# Patient Record
Sex: MALE | Race: WHITE | NOT HISPANIC OR LATINO
[De-identification: names, ages, dates, MRNs, and addresses within clinical notes are randomized per-mention and may not be internally consistent; named-entity substitution may affect disease eponyms.]

---

## 2020-02-25 ENCOUNTER — RECORD ABSTRACTING (OUTPATIENT)
Age: 70
End: 2020-02-25

## 2020-02-25 DIAGNOSIS — Z80.0 FAMILY HISTORY OF MALIGNANT NEOPLASM OF DIGESTIVE ORGANS: ICD-10-CM

## 2020-02-25 RX ORDER — OMEPRAZOLE 20 MG/1
20 TABLET, DELAYED RELEASE ORAL
Refills: 0 | Status: ACTIVE | COMMUNITY

## 2020-02-28 ENCOUNTER — APPOINTMENT (OUTPATIENT)
Dept: GASTROENTEROLOGY | Facility: CLINIC | Age: 70
End: 2020-02-28
Payer: MEDICARE

## 2020-02-28 VITALS
HEIGHT: 68 IN | WEIGHT: 160 LBS | DIASTOLIC BLOOD PRESSURE: 92 MMHG | SYSTOLIC BLOOD PRESSURE: 136 MMHG | HEART RATE: 81 BPM | BODY MASS INDEX: 24.25 KG/M2

## 2020-02-28 PROCEDURE — 99214 OFFICE O/P EST MOD 30 MIN: CPT

## 2020-02-28 RX ORDER — DEXTROAMPHETAMINE SACCHARATE, AMPHETAMINE ASPARTATE, DEXTROAMPHETAMINE SULFATE, AND AMPHETAMINE SULFATE 2.5; 2.5; 2.5; 2.5 MG/1; MG/1; MG/1; MG/1
10 TABLET ORAL
Refills: 0 | Status: DISCONTINUED | COMMUNITY
End: 2020-02-28

## 2020-02-28 NOTE — ASSESSMENT
[FreeTextEntry1] : -proctalgia - fiber, eval mucosa w/ colonoscopy - has been 9y and BMs changed as well; suspect functional.\par -esophageal dysmotility but neg manometry - will repeat EGD as still has food sticking\par -HCV - recommended fibroscan once acute issues resolved

## 2020-02-28 NOTE — HISTORY OF PRESENT ILLNESS
[FreeTextEntry1] : 69m h/o migraines, PUD, hemorrhoids, HCV (followed by Dr. Sandoval in [facility] - serial fibrotic scores - "moderate" per pt- txd w/ Gordon- SVR '16).\par \par Had rectal pressure x last wk with incomplete evacuation of stools, no bleeding/fevers/abd pain.  Sx severe, daily,  lingered but slowly improved.  No n/v/wt loss.  Stools not particularly hard.  No change in diet, meds.  \par \par Dysphagia persists, not progressing, but w/u unrevealing as below, and also saw ENT.\par \par No recent fibrosis scoring w/ Dr. Sandoval\par    \par \par        Prior testing:\par        EGD for dysphagia 7/2014 mild esophagitis, gastric diverticulum in fundus\par        RUQ sx 7-8/2013 evaluated w/ MRI+ (normal),\par        EGD 2014 RUQ pain - fundic polyp, gastric diverticulum. AFP and celiac panel neg.\par        ER for RUQ pain x2 (7/16/13, and 7/23/13) \par        On both visits, CBC, CMET, amylase/lipase essentially normal.\par        7/16/13 US neg RUQ\par        7/23/13 CT(oral only) IMPRESSION: No evidence of mass lesion or inflammatory process within the abdomen or pelvis. The etiology of this patient's abdominal pain has not been elucidated on the study. Further evaluation is recommended. The urinary bladder is distended and there is a bladder diverticulum. Fecal stasis noted.\par        EGD 11/2011 RUQ pain: gastritis, gastric polyp (xanthoma), neg celiac bx, hiatal hernia, gastric diverticulum\par        Colonoscopy 11/2011 screening, abd pain: diverticulosis f/u 8-10y\par        CT scan +iv abd/pelvis 10/2011: neg\par        Prior w/u 2005-'09 w/ Dr. Solomon WP: CLOneg, Hiatal hernia, gastritis.\par \par 2018 US normal\par 2018 dysphagia w/u\par -EGD 5/2018 tortuous esophagus, mild h. hernia, gastric polyps, fundus diverticulum\par -esophagram 6/2018 Esophagram: Marked persistent tertiary contractions in lower esophagus with corkscrew like appearance and at least one probable diverticulum. Rapid emptying of esophagus. Small hiatus hernia and spontaneous gastroesophageal reflux observed. Relatively large diverticulum of gastric fundus.\par -manometry 7/2018 normal w/ Dr. Sierra\par \par Soc:  no tobacco or significant EtOH\par FHx: no FHx GI malignancy or IBD\par \par ROS:\par Constitutional:: no weight loss, fevers\par ENT: no deafness\par Eyes: not blind\par Neck: no LN\par Chest: no dyspnea/cough\par Cardiac: no chest pain\par Vascular: no leg swelling\par GI: no abdominal pain, nausea, vomiting, diarrhea, constipation, rectal bleeding, dysphagia, melena unless otherwise noted in HPI\par : no dysuria, dark urine\par Skin: no rashes, jaundice\par Heme: no bleeding\par Endocrine: no DM unless otherwise stated in HPI\par \par Px: (VS noted below)\par General: NAD\par Eyes: anicteric\par Oropharynx:  clear\par Neck: no LN\par Chest: normal respiratory effort\par CVS: regular\par Abd: soft, NT, ND, +BS, no HSM\par Ext: no atrophy\par Neuro: grossly nonfocal\par LANI normal /no masses\par \par Labs/imaging/prior endoscopic results reviewed to the extent available and noted in HPI\par

## 2020-03-02 ENCOUNTER — RESULT REVIEW (OUTPATIENT)
Age: 70
End: 2020-03-02

## 2020-03-03 ENCOUNTER — APPOINTMENT (OUTPATIENT)
Dept: GASTROENTEROLOGY | Facility: HOSPITAL | Age: 70
End: 2020-03-03

## 2021-05-17 ENCOUNTER — APPOINTMENT (OUTPATIENT)
Dept: GASTROENTEROLOGY | Facility: CLINIC | Age: 71
End: 2021-05-17
Payer: MEDICARE

## 2021-05-17 ENCOUNTER — NON-APPOINTMENT (OUTPATIENT)
Age: 71
End: 2021-05-17

## 2021-05-17 VITALS
BODY MASS INDEX: 25.01 KG/M2 | HEIGHT: 68 IN | SYSTOLIC BLOOD PRESSURE: 130 MMHG | OXYGEN SATURATION: 98 % | DIASTOLIC BLOOD PRESSURE: 80 MMHG | HEART RATE: 84 BPM | TEMPERATURE: 98.6 F | WEIGHT: 165 LBS

## 2021-05-17 PROCEDURE — 99214 OFFICE O/P EST MOD 30 MIN: CPT

## 2021-05-18 NOTE — HISTORY OF PRESENT ILLNESS
[FreeTextEntry1] : 71 m h/o migraines, PUD, hemorrhoids, HCV (followed by Dr. Sandoval in [facility] - serial fibrotic scores - "moderate" per pt- txd w/ Gordon- SVR '16).\par \par Rectal bleeding - off/on for 1year.  May have predated his colonoscopy last year.  No anal pain.  No bulge - worse when strains.  No wt loss or change in BM otherwise.  \par \par Dysphagia resolved. \par    \par Prior testing:\par        EGD for dysphagia 7/2014 mild esophagitis, gastric diverticulum in fundus\par        RUQ sx 7-8/2013 evaluated w/ MRI+ (normal),\par        EGD 2014 RUQ pain - fundic polyp, gastric diverticulum. AFP and celiac panel neg.\par        ER for RUQ pain x2 (7/16/13, and 7/23/13) \par        On both visits, CBC, CMET, amylase/lipase essentially normal.\par        7/16/13 US neg RUQ\par        7/23/13 CT(oral only) IMPRESSION: No evidence of mass lesion or inflammatory process within the abdomen or pelvis. The etiology of this patient's abdominal pain has not been elucidated on the study. Further evaluation is recommended. The urinary bladder is distended and there is a bladder diverticulum. Fecal stasis noted.\par        EGD 11/2011 RUQ pain: gastritis, gastric polyp (xanthoma), neg celiac bx, hiatal hernia, gastric diverticulum\par        Colonoscopy 11/2011 screening, abd pain: diverticulosis f/u 8-10y\par        CT scan +iv abd/pelvis 10/2011: neg\par        Prior w/u 2005-'09 w/ Dr. Solomon WP: CLOneg, Hiatal hernia, gastritis.\par \par 2018 US normal\par 2018 dysphagia w/u\par -EGD 5/2018 tortuous esophagus, mild h. hernia, gastric polyps, fundus diverticulum\par -esophagram 6/2018 Esophagram: Marked persistent tertiary contractions in lower esophagus with corkscrew like appearance and at least one probable diverticulum. Rapid emptying of esophagus. Small hiatus hernia and spontaneous gastroesophageal reflux observed. Relatively large diverticulum of gastric fundus.\par -7/2018 manometry 7/2018 normal w/ Dr. Sierra\par 3/2020 egd for GERD/dysphagia - tortuous esophagus/?trachealized (path NORMAL), small hiatal hernia, fundus diverticulum.\par 3/2020 colonoscopy for change in BM - diverticlosis, int hemorrhoids, polyp --> 5y f/u\par \par Soc:  no tobacco or significant EtOH\par FHx: no FHx GI malignancy or IBD\par \par ROS:\par Constitutional:: no weight loss, fevers\par ENT: no deafness\par Eyes: not blind\par Neck: no LN\par Chest: no dyspnea/cough\par Cardiac: no chest pain\par Vascular: no leg swelling\par GI: no abdominal pain, nausea, vomiting, diarrhea, constipation, rectal bleeding, dysphagia, melena unless otherwise noted in HPI\par : no dysuria, dark urine\par Skin: no rashes, jaundice\par Heme: no bleeding\par Endocrine: no DM unless otherwise stated in HPI\par \par Px: (VS noted below)\par General: NAD\par Eyes: anicteric\par Oropharynx:  clear\par Neck: no LN\par Chest: normal respiratory effort\par CVS: regular\par Abd: soft, NT, ND, +BS, no HSM\par Ext: no atrophy\par Neuro: grossly nonfocal\par LANI normal /no masses\par \par Labs/imaging/prior endoscopic results reviewed to the extent available and noted in HPI\par

## 2021-05-18 NOTE — ASSESSMENT
[FreeTextEntry1] : -rectal bleeding  - fiber supplement.  Hemorrhoids known.  F/U if not resolving 2mo on fiber.\par -esophageal dysmotility but neg manometry - resolved.\par -HCV - s/p tx - will get US and then probably fibroscan.\par PMD/consultation/hospital notes and Labs/imaging/prior endoscopic results reviewed to extent noted in HPI; and, if procedure code billed on this visit for lab draw, this serves to signify that labs were drawn here in this office.\par

## 2021-05-21 ENCOUNTER — RESULT REVIEW (OUTPATIENT)
Age: 71
End: 2021-05-21

## 2021-06-24 ENCOUNTER — NON-APPOINTMENT (OUTPATIENT)
Age: 71
End: 2021-06-24

## 2021-06-24 ENCOUNTER — APPOINTMENT (OUTPATIENT)
Dept: FAMILY MEDICINE | Facility: CLINIC | Age: 71
End: 2021-06-24
Payer: MEDICARE

## 2021-06-24 VITALS
HEIGHT: 68 IN | BODY MASS INDEX: 25.76 KG/M2 | WEIGHT: 170 LBS | HEART RATE: 82 BPM | OXYGEN SATURATION: 97 % | DIASTOLIC BLOOD PRESSURE: 90 MMHG | SYSTOLIC BLOOD PRESSURE: 160 MMHG | TEMPERATURE: 97.1 F

## 2021-06-24 VITALS — SYSTOLIC BLOOD PRESSURE: 120 MMHG | DIASTOLIC BLOOD PRESSURE: 80 MMHG

## 2021-06-24 DIAGNOSIS — I49.9 CARDIAC ARRHYTHMIA, UNSPECIFIED: ICD-10-CM

## 2021-06-24 DIAGNOSIS — Z87.19 PERSONAL HISTORY OF OTHER DISEASES OF THE DIGESTIVE SYSTEM: ICD-10-CM

## 2021-06-24 DIAGNOSIS — H35.30 UNSPECIFIED MACULAR DEGENERATION: ICD-10-CM

## 2021-06-24 DIAGNOSIS — K62.89 OTHER SPECIFIED DISEASES OF ANUS AND RECTUM: ICD-10-CM

## 2021-06-24 DIAGNOSIS — R10.11 RIGHT UPPER QUADRANT PAIN: ICD-10-CM

## 2021-06-24 DIAGNOSIS — R13.10 DYSPHAGIA, UNSPECIFIED: ICD-10-CM

## 2021-06-24 DIAGNOSIS — K64.9 UNSPECIFIED HEMORRHOIDS: ICD-10-CM

## 2021-06-24 PROCEDURE — 99203 OFFICE O/P NEW LOW 30 MIN: CPT | Mod: 25

## 2021-06-24 PROCEDURE — 93000 ELECTROCARDIOGRAM COMPLETE: CPT

## 2021-06-24 RX ORDER — TADALAFIL 20 MG/1
20 TABLET, FILM COATED ORAL
Refills: 0 | Status: ACTIVE | COMMUNITY

## 2021-06-24 NOTE — HEALTH RISK ASSESSMENT
[30 or more] : 30 or more [Patient reported colonoscopy was normal] : Patient reported colonoscopy was normal [HIV test declined] : HIV test declined [Hepatitis C test declined] : Hepatitis C test declined [Very Good] : ~his/her~  mood as very good [] : No [ColonoscopyDate] : 3/2020 [ColonoscopyComments] : 5 yr

## 2021-06-24 NOTE — PHYSICAL EXAM
[Normal Rate] : normal rate  [No Murmur] : no murmur heard [Normal] : normal gait, coordination grossly intact, no focal deficits and deep tendon reflexes were 2+ and symmetric [de-identified] : Abnormal rhythm

## 2021-06-24 NOTE — HISTORY OF PRESENT ILLNESS
[de-identified] : 71-year-old male history of GERD hep C infection status post treatment with Harvoni, BPH presents today establish care exam\par Dr. Chacon - [place] - neurologist \par \par Vascepa-patient requested Vascepa at this visit.  No record of the patient's lipids.  Patient is very health-conscious and is concerned regarding a history of abnormal brain MRI and history of Alzheimer's in his father.\par \par cataracts\par macular degeneration right eye \par erosion right cornea- Ashley \par \par Hearing loss\par hearing aids \par \par fibrosis- moderate fibrosis identified on recent elastography\par Hep C treated- Harvoni- 5 yrs ago \par \par Varicocele\par Followed with urology\par Periodic drainage\par [facility] \par \par \par

## 2021-06-24 NOTE — ASSESSMENT
[FreeTextEntry1] : 71-year-old male with a history of hep C status post treatment with Harvoni, history of moderate fibrosis secondary to history of hep C, cataract, macular degeneration, varicocele, former smoker presents today to establish care.\par Abnormal rhythm was noted on exam likely secondary to respiration given normal EKG.\par

## 2021-06-30 ENCOUNTER — NON-APPOINTMENT (OUTPATIENT)
Age: 71
End: 2021-06-30

## 2021-08-02 ENCOUNTER — APPOINTMENT (OUTPATIENT)
Age: 71
End: 2021-08-02
Payer: MEDICARE

## 2021-08-02 ENCOUNTER — NON-APPOINTMENT (OUTPATIENT)
Age: 71
End: 2021-08-02

## 2021-08-02 VITALS
OXYGEN SATURATION: 98 % | SYSTOLIC BLOOD PRESSURE: 150 MMHG | HEIGHT: 68 IN | HEART RATE: 71 BPM | TEMPERATURE: 98.2 F | WEIGHT: 168 LBS | BODY MASS INDEX: 25.46 KG/M2 | DIASTOLIC BLOOD PRESSURE: 80 MMHG

## 2021-08-02 PROCEDURE — 36415 COLL VENOUS BLD VENIPUNCTURE: CPT

## 2021-08-02 PROCEDURE — G0439: CPT

## 2021-08-02 NOTE — PLAN
[FreeTextEntry1] : patient with appropriate preventative UTD \par no concerns on exam \par age appropriate counseling given\par Tetanus administered today\par CT scan to follow-up pulmonary nodules\par

## 2021-08-02 NOTE — HISTORY OF PRESENT ILLNESS
[de-identified] : 71-year-old male with history of GERD, mild sleep apnea, pulmonary nodules, hydrocele presents today for annual exam\par \par hydrocele \par Patient has periodic drainage of hydrocele with urology.\par \par Sleep apnea\par Continues using mouth apparatus\par \par History of abnormal brain MRI with microvascular changes.  Is being monitored by neurology and is recommended regular monitoring of his cholesterol

## 2021-08-02 NOTE — HEALTH RISK ASSESSMENT
[Very Good] : ~his/her~  mood as very good [10-14] : 10-14 [No] : In the past 12 months have you used drugs other than those required for medical reasons? No [No falls in past year] : Patient reported no falls in the past year [0] : 2) Feeling down, depressed, or hopeless: Not at all (0) [PHQ-2 Negative - No further assessment needed] : PHQ-2 Negative - No further assessment needed [Patient reported colonoscopy was normal] : Patient reported colonoscopy was normal [With Family] : lives with family [Retired] : retired [] :  [Fully functional (bathing, dressing, toileting, transferring, walking, feeding)] : Fully functional (bathing, dressing, toileting, transferring, walking, feeding) [Fully functional (using the telephone, shopping, preparing meals, housekeeping, doing laundry, using] : Fully functional and needs no help or supervision to perform IADLs (using the telephone, shopping, preparing meals, housekeeping, doing laundry, using transportation, managing medications and managing finances) [] : No [de-identified] : SWIMMING 4 MILES Q3days; meditation; stretching push ups sit ups [de-identified] : closely monitoring diet  [[id number]] : 0 [Change in mental status noted] : No change in mental status noted [Reports changes in hearing] : Reports no changes in hearing [Reports changes in vision] : Reports no changes in vision [ColonoscopyDate] : 2020

## 2021-08-02 NOTE — PHYSICAL EXAM
[Normal Outer Ear/Nose] : the outer ears and nose were normal in appearance [Normal TMs] : both tympanic membranes were normal [No Edema] : there was no peripheral edema [Normal] : affect was normal and insight and judgment were intact

## 2021-08-06 ENCOUNTER — RESULT REVIEW (OUTPATIENT)
Age: 71
End: 2021-08-06

## 2021-08-11 ENCOUNTER — NON-APPOINTMENT (OUTPATIENT)
Age: 71
End: 2021-08-11

## 2021-08-11 ENCOUNTER — APPOINTMENT (OUTPATIENT)
Dept: FAMILY MEDICINE | Facility: CLINIC | Age: 71
End: 2021-08-11
Payer: MEDICARE

## 2021-08-11 DIAGNOSIS — Z91.89 OTHER SPECIFIED PERSONAL RISK FACTORS, NOT ELSEWHERE CLASSIFIED: ICD-10-CM

## 2021-08-11 LAB
ALBUMIN SERPL ELPH-MCNC: 4.4 G/DL
ALP BLD-CCNC: 69 U/L
ALT SERPL-CCNC: 23 U/L
ANION GAP SERPL CALC-SCNC: 11 MMOL/L
AST SERPL-CCNC: 20 U/L
BASOPHILS # BLD AUTO: 0.05 K/UL
BASOPHILS NFR BLD AUTO: 1 %
BILIRUB SERPL-MCNC: 0.5 MG/DL
BUN SERPL-MCNC: 14 MG/DL
CALCIUM SERPL-MCNC: 9.6 MG/DL
CHLORIDE SERPL-SCNC: 104 MMOL/L
CHOLEST SERPL-MCNC: 206 MG/DL
CO2 SERPL-SCNC: 26 MMOL/L
CREAT SERPL-MCNC: 1.11 MG/DL
EOSINOPHIL # BLD AUTO: 0.31 K/UL
EOSINOPHIL NFR BLD AUTO: 6 %
GLUCOSE SERPL-MCNC: 107 MG/DL
HCT VFR BLD CALC: 44.9 %
HDLC SERPL-MCNC: 53 MG/DL
HGB BLD-MCNC: 14.6 G/DL
IMM GRANULOCYTES NFR BLD AUTO: 0.2 %
LDLC SERPL CALC-MCNC: 138 MG/DL
LYMPHOCYTES # BLD AUTO: 1.76 K/UL
LYMPHOCYTES NFR BLD AUTO: 34 %
MAN DIFF?: NORMAL
MCHC RBC-ENTMCNC: 31.3 PG
MCHC RBC-ENTMCNC: 32.5 GM/DL
MCV RBC AUTO: 96.1 FL
MONOCYTES # BLD AUTO: 0.59 K/UL
MONOCYTES NFR BLD AUTO: 11.4 %
NEUTROPHILS # BLD AUTO: 2.46 K/UL
NEUTROPHILS NFR BLD AUTO: 47.4 %
NONHDLC SERPL-MCNC: 153 MG/DL
PLATELET # BLD AUTO: 266 K/UL
POTASSIUM SERPL-SCNC: 4.6 MMOL/L
PROT SERPL-MCNC: 6.8 G/DL
RBC # BLD: 4.67 M/UL
RBC # FLD: 12.6 %
SODIUM SERPL-SCNC: 141 MMOL/L
TRIGL SERPL-MCNC: 71 MG/DL
WBC # FLD AUTO: 5.18 K/UL

## 2021-08-11 PROCEDURE — 99212 OFFICE O/P EST SF 10 MIN: CPT | Mod: 95

## 2021-08-11 NOTE — ASSESSMENT
[FreeTextEntry1] : Pulmonary nodule was noted to be 7 mm.  Stable from previous.  Patient is asymptomatic.  Continue to monitor at this time further checkups given the long history without change is unnecessary.\par Discussed with the patient the ASCVD risk score which is noted to be elevated at 17%.\par Counseled on management at this point given his history of excellent exercise and diet.  Patient would like an evaluation by cardiology to further assess his cardiac risk and determine the need for statin

## 2021-08-11 NOTE — HISTORY OF PRESENT ILLNESS
[de-identified] : 71 yr old M with a history GERD, dyslipidemia presents today for follow-up of labs\par Lipid was performed and patient notably at this time has ASCVD risk were 17.2%\par

## 2021-09-15 ENCOUNTER — APPOINTMENT (OUTPATIENT)
Dept: CARDIOLOGY | Facility: CLINIC | Age: 71
End: 2021-09-15
Payer: MEDICARE

## 2021-09-15 ENCOUNTER — NON-APPOINTMENT (OUTPATIENT)
Age: 71
End: 2021-09-15

## 2021-09-15 VITALS
SYSTOLIC BLOOD PRESSURE: 136 MMHG | DIASTOLIC BLOOD PRESSURE: 82 MMHG | OXYGEN SATURATION: 99 % | RESPIRATION RATE: 12 BRPM | HEART RATE: 69 BPM | BODY MASS INDEX: 25.54 KG/M2 | WEIGHT: 168 LBS

## 2021-09-15 DIAGNOSIS — Z78.9 OTHER SPECIFIED HEALTH STATUS: ICD-10-CM

## 2021-09-15 DIAGNOSIS — Z87.891 PERSONAL HISTORY OF NICOTINE DEPENDENCE: ICD-10-CM

## 2021-09-15 PROCEDURE — 99204 OFFICE O/P NEW MOD 45 MIN: CPT

## 2021-09-15 PROCEDURE — 93000 ELECTROCARDIOGRAM COMPLETE: CPT

## 2021-09-16 ENCOUNTER — NON-APPOINTMENT (OUTPATIENT)
Age: 71
End: 2021-09-16

## 2021-09-16 PROBLEM — Z87.891 FORMER SMOKER: Status: ACTIVE | Noted: 2021-06-24

## 2021-09-16 PROBLEM — Z78.9 CAFFEINE USE: Status: ACTIVE | Noted: 2021-09-16

## 2021-09-16 RX ORDER — ASPIRIN 81 MG
81 TABLET, DELAYED RELEASE (ENTERIC COATED) ORAL DAILY
Refills: 0 | Status: ACTIVE | COMMUNITY

## 2021-09-16 NOTE — PHYSICAL EXAM
[Well Developed] : well developed [Well Nourished] : well nourished [No Acute Distress] : no acute distress [Normal Conjunctiva] : normal conjunctiva [Normal Venous Pressure] : normal venous pressure [No Carotid Bruit] : no carotid bruit [Normal Rate] : normal [Premature Beats] : regular with premature beats [Normal S1] : normal S1 [Normal S2] : normal S2 [No Murmur] : no murmurs heard [No Pitting Edema] : no pitting edema present [2+] : left 2+ [Clear Lung Fields] : clear lung fields [Good Air Entry] : good air entry [No Respiratory Distress] : no respiratory distress  [Normal Gait] : normal gait [No Edema] : no edema [No Cyanosis] : no cyanosis [No Clubbing] : no clubbing [Moves all extremities] : moves all extremities [No Focal Deficits] : no focal deficits [Normal Speech] : normal speech [Alert and Oriented] : alert and oriented [Normal memory] : normal memory [S3] : no S3 [S4] : no S4 [Right Carotid Bruit] : no bruit heard over the right carotid [Left Carotid Bruit] : no bruit heard over the left carotid

## 2021-09-16 NOTE — HISTORY OF PRESENT ILLNESS
[FreeTextEntry1] : 70 yo male who presents today for cardiac evaluation of his hyperlipidemia and cardiac risk assessment at the request of his primary care physician. At his last PMD visit, patient was determined to have 10-year ASCVD risk of 17.2% and cardiac assessment was advised to determine need for statin. Patient denies any exertional complaints. Patient denies chest pain, dyspnea, palpitations, syncope, edema, melena, hematochezia, or hematemesis. He swims 2-3x/week, weight training 1-2x/week, and elliptical 3x/week for ~ 30 minutes without complaints. He reports having a normal stress test and echocardiogram ~ 10 years ago.\par \par PMD: David Peters MD

## 2021-09-16 NOTE — ASSESSMENT
[FreeTextEntry1] : 72 yo male with hyperlipidemia and 10-year ASCVD risk = 21% (based on his current BP and 8/2/21 labs).\par ECG today demonstrated sinus rhythm with PACs and delayed R wave progression.\par \par Given elevated 10-year ASCVD risk, it was advised that patient consider initiation of statin therapy. However, patient expressed concerns given his history of Hep C (treated) and evidence of liver fibrosis. It was explained that his LFTs were normal per 8/2/21 labs and that his labs would be monitored while on statin therapy. \par Patient wanted some time to think about it and will contact his PMD or me if he decides to initiate statin therapy.\par Given absence of exertional complaints, no cardiac testing is indicated at this time. \par Continued regular exercise and heart healthy diet were advised.

## 2021-09-20 ENCOUNTER — NON-APPOINTMENT (OUTPATIENT)
Age: 71
End: 2021-09-20

## 2021-10-25 ENCOUNTER — APPOINTMENT (OUTPATIENT)
Dept: FAMILY MEDICINE | Facility: CLINIC | Age: 71
End: 2021-10-25
Payer: MEDICARE

## 2021-10-25 VITALS
OXYGEN SATURATION: 96 % | WEIGHT: 170 LBS | HEART RATE: 70 BPM | SYSTOLIC BLOOD PRESSURE: 130 MMHG | DIASTOLIC BLOOD PRESSURE: 80 MMHG | HEIGHT: 68 IN | BODY MASS INDEX: 25.76 KG/M2 | TEMPERATURE: 97.8 F

## 2021-10-25 PROCEDURE — 99214 OFFICE O/P EST MOD 30 MIN: CPT

## 2021-10-25 NOTE — ASSESSMENT
[High Risk Surgery - Intraperitoneal, Intrathoracic or Supringuinal Vascular Procedures] : High Risk Surgery - Intraperitoneal, Intrathoracic or Supringuinal Vascular Procedures - No (0) [Ischemic Heart Disease] : Ischemic Heart Disease - No (0) [Congestive Heart Failure] : Congestive Heart Failure - No (0) [Prior Cerebrovascular Accident or TIA] : Prior Cerebrovascular Accident or TIA - No (0) [Creatinine >= 2mg/dL (1 Point)] : Creatinine >= 2mg/dL - No (0) [Insulin-dependent Diabetic (1 Point)] : Insulin-dependent Diabetic - No (0) [0] : 0 , RCRI Class: I, Risk of Post-Op Cardiac Complications: 3.9%, 95% CI for Risk Estimate: 2.8% - 5.4% [Patient Optimized for Surgery] : Patient optimized for surgery [No Further Testing Recommended] : no further testing recommended [Modify anti-platelet treatment prior to procedure] : Modify anti-platelet treatment prior to procedure [FreeTextEntry4] : 71-year-old male with history of hyperlipidemia, sleep apnea presents today for preoperative exam for cataract surgery\par RCRI 0\par Low risk for low risk procedure\par METs excellent greater than 10 [FreeTextEntry6] : Stop at least 5 days before procedure

## 2021-10-25 NOTE — HISTORY OF PRESENT ILLNESS
[No Pertinent Cardiac History] : no history of aortic stenosis, atrial fibrillation, coronary artery disease, recent myocardial infarction, or implantable device/pacemaker [Sleep Apnea] : sleep apnea [No Adverse Anesthesia Reaction] : no adverse anesthesia reaction in self or family member [(Patient denies any chest pain, claudication, dyspnea on exertion, orthopnea, palpitations or syncope)] : Patient denies any chest pain, claudication, dyspnea on exertion, orthopnea, palpitations or syncope [Excellent (>10 METs)] : Excellent (>10 METs) [Chronic Anticoagulation] : no chronic anticoagulation [Chronic Kidney Disease] : no chronic kidney disease [FreeTextEntry1] : Left cataract surgery  [FreeTextEntry2] : 11/4/2021 [FreeTextEntry3] : Dr. Chambers [FreeTextEntry4] : 71-year-old male with history of hyperlipidemia, sleep apnea presents today for preoperative exam for cataract surgery\par Moderate sleep apnea with a mouth piece \par \par Quit smoking at age 14  [FreeTextEntry7] : EKG within normal limits.  Normal sinus rhythm with a rate of 75 with noted PACs and nonspecific ST segment changes

## 2021-10-27 LAB
ALBUMIN SERPL ELPH-MCNC: 4.4 G/DL
ALP BLD-CCNC: 68 U/L
ALT SERPL-CCNC: 20 U/L
ANION GAP SERPL CALC-SCNC: 15 MMOL/L
AST SERPL-CCNC: 21 U/L
BASOPHILS # BLD AUTO: 0.04 K/UL
BASOPHILS NFR BLD AUTO: 0.8 %
BILIRUB SERPL-MCNC: 0.5 MG/DL
BUN SERPL-MCNC: 16 MG/DL
CALCIUM SERPL-MCNC: 9.5 MG/DL
CHLORIDE SERPL-SCNC: 102 MMOL/L
CK SERPL-CCNC: 76 U/L
CO2 SERPL-SCNC: 24 MMOL/L
CREAT SERPL-MCNC: 0.98 MG/DL
EOSINOPHIL # BLD AUTO: 0.32 K/UL
EOSINOPHIL NFR BLD AUTO: 6.8 %
GLUCOSE SERPL-MCNC: 114 MG/DL
HCT VFR BLD CALC: 44.9 %
HGB BLD-MCNC: 14.6 G/DL
IMM GRANULOCYTES NFR BLD AUTO: 0.2 %
LYMPHOCYTES # BLD AUTO: 1.36 K/UL
LYMPHOCYTES NFR BLD AUTO: 28.8 %
MAN DIFF?: NORMAL
MCHC RBC-ENTMCNC: 31.7 PG
MCHC RBC-ENTMCNC: 32.5 GM/DL
MCV RBC AUTO: 97.4 FL
MONOCYTES # BLD AUTO: 0.46 K/UL
MONOCYTES NFR BLD AUTO: 9.7 %
NEUTROPHILS # BLD AUTO: 2.53 K/UL
NEUTROPHILS NFR BLD AUTO: 53.7 %
PLATELET # BLD AUTO: 226 K/UL
POTASSIUM SERPL-SCNC: 4.4 MMOL/L
PROT SERPL-MCNC: 6.7 G/DL
RBC # BLD: 4.61 M/UL
RBC # FLD: 12.2 %
SODIUM SERPL-SCNC: 141 MMOL/L
WBC # FLD AUTO: 4.72 K/UL

## 2021-12-06 ENCOUNTER — APPOINTMENT (OUTPATIENT)
Dept: FAMILY MEDICINE | Facility: CLINIC | Age: 71
End: 2021-12-06

## 2021-12-06 ENCOUNTER — APPOINTMENT (OUTPATIENT)
Dept: FAMILY MEDICINE | Facility: CLINIC | Age: 71
End: 2021-12-06
Payer: MEDICARE

## 2021-12-06 VITALS
TEMPERATURE: 98.7 F | OXYGEN SATURATION: 98 % | HEIGHT: 68 IN | BODY MASS INDEX: 25.16 KG/M2 | WEIGHT: 166 LBS | SYSTOLIC BLOOD PRESSURE: 130 MMHG | HEART RATE: 71 BPM | DIASTOLIC BLOOD PRESSURE: 80 MMHG

## 2021-12-06 DIAGNOSIS — H26.9 UNSPECIFIED CATARACT: ICD-10-CM

## 2021-12-06 PROCEDURE — 36415 COLL VENOUS BLD VENIPUNCTURE: CPT

## 2021-12-06 PROCEDURE — 99215 OFFICE O/P EST HI 40 MIN: CPT | Mod: 25

## 2021-12-06 NOTE — HISTORY OF PRESENT ILLNESS
[No Pertinent Cardiac History] : no history of aortic stenosis, atrial fibrillation, coronary artery disease, recent myocardial infarction, or implantable device/pacemaker [No Pertinent Pulmonary History] : no history of asthma, COPD, sleep apnea, or smoking [No Adverse Anesthesia Reaction] : no adverse anesthesia reaction in self or family member [(Patient denies any chest pain, claudication, dyspnea on exertion, orthopnea, palpitations or syncope)] : Patient denies any chest pain, claudication, dyspnea on exertion, orthopnea, palpitations or syncope [Excellent (>10 METs)] : Excellent (>10 METs) [Chronic Anticoagulation] : no chronic anticoagulation [Chronic Kidney Disease] : no chronic kidney disease [Diabetes] : no diabetes [FreeTextEntry1] : hydrocelectomy  [FreeTextEntry2] : 12/16/2021 [FreeTextEntry3] : Dr. Clark  [FreeTextEntry4] : 71 yr old M with a history of hydrocele and dyslipidemia presenting for preoperative evaluation\par Patient is feeling well no major concerns at this time\par  [FreeTextEntry7] : EKG performed in September 2021\par Normal sinus rhythm with PACs

## 2021-12-06 NOTE — RESULTS/DATA
[] : results reviewed [ECG Reviewed] : reviewed [Normal] : The 12 - lead ECG is normal [NSR] : normal sinus rhythm

## 2021-12-06 NOTE — ASSESSMENT
[High Risk Surgery - Intraperitoneal, Intrathoracic or Supringuinal Vascular Procedures] : High Risk Surgery - Intraperitoneal, Intrathoracic or Supringuinal Vascular Procedures - No (0) [Ischemic Heart Disease] : Ischemic Heart Disease - No (0) [Congestive Heart Failure] : Congestive Heart Failure - No (0) [Prior Cerebrovascular Accident or TIA] : Prior Cerebrovascular Accident or TIA - No (0) [Creatinine >= 2mg/dL (1 Point)] : Creatinine >= 2mg/dL - No (0) [Insulin-dependent Diabetic (1 Point)] : Insulin-dependent Diabetic - No (0) [0] : 0 , RCRI Class: I, Risk of Post-Op Cardiac Complications: 3.9%, 95% CI for Risk Estimate: 2.8% - 5.4% [Patient Optimized for Surgery] : Patient optimized for surgery [FreeTextEntry4] : 71 yr old M with a history of hydrocele and dyslipidemia presenting for preoperative evaluation\par RCRI 0\par METs are excellent\par Patient is low risk for low risk procedure

## 2021-12-07 ENCOUNTER — LABORATORY RESULT (OUTPATIENT)
Age: 71
End: 2021-12-07

## 2021-12-09 LAB
ANION GAP SERPL CALC-SCNC: 17 MMOL/L
APPEARANCE: CLEAR
APTT BLD: 33.4 SEC
BACTERIA UR CULT: NORMAL
BASOPHILS # BLD AUTO: 0.04 K/UL
BASOPHILS NFR BLD AUTO: 0.7 %
BILIRUBIN URINE: NEGATIVE
BLOOD URINE: NEGATIVE
BUN SERPL-MCNC: 15 MG/DL
CALCIUM SERPL-MCNC: 10.1 MG/DL
CHLORIDE SERPL-SCNC: 102 MMOL/L
CHOLEST SERPL-MCNC: 168 MG/DL
CO2 SERPL-SCNC: 22 MMOL/L
COLOR: ABNORMAL
CREAT SERPL-MCNC: 1.14 MG/DL
EOSINOPHIL # BLD AUTO: 0.23 K/UL
EOSINOPHIL NFR BLD AUTO: 4 %
GLUCOSE QUALITATIVE U: NEGATIVE
GLUCOSE SERPL-MCNC: 101 MG/DL
HCT VFR BLD CALC: 47.2 %
HDLC SERPL-MCNC: 65 MG/DL
HGB BLD-MCNC: 15.7 G/DL
IMM GRANULOCYTES NFR BLD AUTO: 0.2 %
INR PPP: 1.01 RATIO
KETONES URINE: NEGATIVE
LDLC SERPL CALC-MCNC: 89 MG/DL
LEUKOCYTE ESTERASE URINE: NEGATIVE
LYMPHOCYTES # BLD AUTO: 1.37 K/UL
LYMPHOCYTES NFR BLD AUTO: 24 %
MAN DIFF?: NORMAL
MCHC RBC-ENTMCNC: 32.2 PG
MCHC RBC-ENTMCNC: 33.3 GM/DL
MCV RBC AUTO: 96.9 FL
MONOCYTES # BLD AUTO: 0.56 K/UL
MONOCYTES NFR BLD AUTO: 9.8 %
NEUTROPHILS # BLD AUTO: 3.49 K/UL
NEUTROPHILS NFR BLD AUTO: 61.3 %
NITRITE URINE: NEGATIVE
NONHDLC SERPL-MCNC: 103 MG/DL
PH URINE: 6
PLATELET # BLD AUTO: 256 K/UL
POTASSIUM SERPL-SCNC: 4.3 MMOL/L
PROTEIN URINE: NORMAL
PT BLD: 12 SEC
RBC # BLD: 4.87 M/UL
RBC # FLD: 12.3 %
SODIUM SERPL-SCNC: 141 MMOL/L
SPECIFIC GRAVITY URINE: 1.03
TRIGL SERPL-MCNC: 66 MG/DL
UROBILINOGEN URINE: NORMAL
WBC # FLD AUTO: 5.7 K/UL

## 2021-12-17 ENCOUNTER — RX RENEWAL (OUTPATIENT)
Age: 71
End: 2021-12-17

## 2021-12-22 ENCOUNTER — RX RENEWAL (OUTPATIENT)
Age: 71
End: 2021-12-22

## 2022-02-28 ENCOUNTER — APPOINTMENT (OUTPATIENT)
Dept: FAMILY MEDICINE | Facility: CLINIC | Age: 72
End: 2022-02-28
Payer: MEDICARE

## 2022-02-28 VITALS
HEIGHT: 68 IN | WEIGHT: 170 LBS | BODY MASS INDEX: 25.76 KG/M2 | DIASTOLIC BLOOD PRESSURE: 70 MMHG | OXYGEN SATURATION: 97 % | SYSTOLIC BLOOD PRESSURE: 120 MMHG | TEMPERATURE: 98.5 F | HEART RATE: 78 BPM

## 2022-02-28 PROCEDURE — 36415 COLL VENOUS BLD VENIPUNCTURE: CPT

## 2022-02-28 PROCEDURE — 99213 OFFICE O/P EST LOW 20 MIN: CPT | Mod: 25

## 2022-02-28 NOTE — HISTORY OF PRESENT ILLNESS
[de-identified] : 71-year-old male with a history of hyperlipidemia presents today for follow-up of hyperlipidemia\par The patient was switched to every other day dosing rosuvastatin due to intolerance\par Symptoms were libido feeling weak\par Patient is tolerating well at this time\par

## 2022-03-01 LAB
CHOLEST SERPL-MCNC: 160 MG/DL
HDLC SERPL-MCNC: 53 MG/DL
LDLC SERPL CALC-MCNC: 89 MG/DL
NONHDLC SERPL-MCNC: 107 MG/DL
TRIGL SERPL-MCNC: 90 MG/DL

## 2022-05-27 ENCOUNTER — APPOINTMENT (OUTPATIENT)
Dept: FAMILY MEDICINE | Facility: CLINIC | Age: 72
End: 2022-05-27

## 2022-08-03 ENCOUNTER — APPOINTMENT (OUTPATIENT)
Dept: FAMILY MEDICINE | Facility: CLINIC | Age: 72
End: 2022-08-03

## 2022-08-03 VITALS
OXYGEN SATURATION: 96 % | SYSTOLIC BLOOD PRESSURE: 140 MMHG | DIASTOLIC BLOOD PRESSURE: 80 MMHG | BODY MASS INDEX: 24.55 KG/M2 | HEIGHT: 68 IN | HEART RATE: 80 BPM | WEIGHT: 162 LBS | TEMPERATURE: 98.5 F

## 2022-08-03 VITALS — DIASTOLIC BLOOD PRESSURE: 80 MMHG | SYSTOLIC BLOOD PRESSURE: 120 MMHG

## 2022-08-03 DIAGNOSIS — R91.8 OTHER NONSPECIFIC ABNORMAL FINDING OF LUNG FIELD: ICD-10-CM

## 2022-08-03 DIAGNOSIS — R90.89 OTHER ABNORMAL FINDINGS ON DIAGNOSTIC IMAGING OF CENTRAL NERVOUS SYSTEM: ICD-10-CM

## 2022-08-03 DIAGNOSIS — Z23 ENCOUNTER FOR IMMUNIZATION: ICD-10-CM

## 2022-08-03 DIAGNOSIS — Z87.438 PERSONAL HISTORY OF OTHER DISEASES OF MALE GENITAL ORGANS: ICD-10-CM

## 2022-08-03 DIAGNOSIS — Z01.818 ENCOUNTER FOR OTHER PREPROCEDURAL EXAMINATION: ICD-10-CM

## 2022-08-03 DIAGNOSIS — G47.30 SLEEP APNEA, UNSPECIFIED: ICD-10-CM

## 2022-08-03 DIAGNOSIS — Z12.5 ENCOUNTER FOR SCREENING FOR MALIGNANT NEOPLASM OF PROSTATE: ICD-10-CM

## 2022-08-03 DIAGNOSIS — Z86.79 PERSONAL HISTORY OF OTHER DISEASES OF THE CIRCULATORY SYSTEM: ICD-10-CM

## 2022-08-03 PROCEDURE — 36415 COLL VENOUS BLD VENIPUNCTURE: CPT

## 2022-08-03 PROCEDURE — G0439: CPT

## 2022-08-03 RX ORDER — FLUOXETINE HYDROCHLORIDE 20 MG/1
20 CAPSULE ORAL
Qty: 30 | Refills: 0 | Status: COMPLETED | COMMUNITY
Start: 2022-07-06

## 2022-08-03 RX ORDER — KETOROLAC TROMETHAMINE 5 MG/ML
0.5 SOLUTION OPHTHALMIC
Qty: 5 | Refills: 0 | Status: DISCONTINUED | COMMUNITY
Start: 2021-10-26 | End: 2022-08-03

## 2022-08-03 RX ORDER — GATIFLOXACIN 5 MG/ML
0.5 SOLUTION/ DROPS OPHTHALMIC
Qty: 2 | Refills: 0 | Status: COMPLETED | COMMUNITY
Start: 2021-10-26 | End: 2022-08-03

## 2022-08-03 RX ORDER — METHYLPHENIDATE HYDROCHLORIDE 18 MG/1
18 TABLET, EXTENDED RELEASE ORAL
Qty: 30 | Refills: 0 | Status: DISCONTINUED | COMMUNITY
Start: 2021-06-04 | End: 2022-08-03

## 2022-08-03 RX ORDER — PREDNISOLONE ACETATE 10 MG/ML
1 SUSPENSION/ DROPS OPHTHALMIC
Qty: 5 | Refills: 0 | Status: DISCONTINUED | COMMUNITY
Start: 2021-10-26 | End: 2022-08-03

## 2022-08-03 RX ORDER — TOBRAMYCIN AND DEXAMETHASONE 3; 1 MG/ML; MG/ML
0.3-0.1 SUSPENSION/ DROPS OPHTHALMIC
Qty: 5 | Refills: 0 | Status: COMPLETED | COMMUNITY
Start: 2022-06-28

## 2022-08-03 RX ORDER — TRIAMCINOLONE ACETONIDE 1 MG/G
0.1 CREAM TOPICAL
Qty: 80 | Refills: 0 | Status: COMPLETED | COMMUNITY
Start: 2022-07-14

## 2022-08-04 LAB
ALBUMIN SERPL ELPH-MCNC: 4.7 G/DL
ALP BLD-CCNC: 65 U/L
ALT SERPL-CCNC: 15 U/L
ANION GAP SERPL CALC-SCNC: 13 MMOL/L
APPEARANCE: CLEAR
AST SERPL-CCNC: 20 U/L
BACTERIA: NEGATIVE
BASOPHILS # BLD AUTO: 0.03 K/UL
BASOPHILS NFR BLD AUTO: 0.5 %
BILIRUB SERPL-MCNC: 0.7 MG/DL
BILIRUBIN URINE: NEGATIVE
BLOOD URINE: NEGATIVE
BUN SERPL-MCNC: 16 MG/DL
CALCIUM SERPL-MCNC: 10.1 MG/DL
CHLORIDE SERPL-SCNC: 102 MMOL/L
CHOLEST SERPL-MCNC: 155 MG/DL
CO2 SERPL-SCNC: 24 MMOL/L
COLOR: ABNORMAL
CREAT SERPL-MCNC: 0.88 MG/DL
EGFR: 91 ML/MIN/1.73M2
EOSINOPHIL # BLD AUTO: 0.31 K/UL
EOSINOPHIL NFR BLD AUTO: 5.7 %
ESTIMATED AVERAGE GLUCOSE: 114 MG/DL
GLUCOSE QUALITATIVE U: NEGATIVE
GLUCOSE SERPL-MCNC: 165 MG/DL
HBA1C MFR BLD HPLC: 5.6 %
HCT VFR BLD CALC: 43.1 %
HDLC SERPL-MCNC: 52 MG/DL
HGB BLD-MCNC: 14.7 G/DL
HYALINE CASTS: 0 /LPF
IMM GRANULOCYTES NFR BLD AUTO: 0 %
KETONES URINE: NEGATIVE
LDLC SERPL CALC-MCNC: 88 MG/DL
LEUKOCYTE ESTERASE URINE: NEGATIVE
LYMPHOCYTES # BLD AUTO: 1.06 K/UL
LYMPHOCYTES NFR BLD AUTO: 19.4 %
MAN DIFF?: NORMAL
MCHC RBC-ENTMCNC: 31.8 PG
MCHC RBC-ENTMCNC: 34.1 GM/DL
MCV RBC AUTO: 93.3 FL
MICROSCOPIC-UA: NORMAL
MONOCYTES # BLD AUTO: 0.44 K/UL
MONOCYTES NFR BLD AUTO: 8.1 %
NEUTROPHILS # BLD AUTO: 3.62 K/UL
NEUTROPHILS NFR BLD AUTO: 66.3 %
NITRITE URINE: NEGATIVE
NONHDLC SERPL-MCNC: 103 MG/DL
PH URINE: 6.5
PLATELET # BLD AUTO: 246 K/UL
POTASSIUM SERPL-SCNC: 4.3 MMOL/L
PROT SERPL-MCNC: 7 G/DL
PROTEIN URINE: NEGATIVE
PSA FREE FLD-MCNC: 20 %
PSA FREE SERPL-MCNC: 0.18 NG/ML
PSA SERPL-MCNC: 0.92 NG/ML
RBC # BLD: 4.62 M/UL
RBC # FLD: 12.2 %
RED BLOOD CELLS URINE: 1 /HPF
SODIUM SERPL-SCNC: 139 MMOL/L
SPECIFIC GRAVITY URINE: 1.03
SQUAMOUS EPITHELIAL CELLS: 0 /HPF
TRIGL SERPL-MCNC: 78 MG/DL
UROBILINOGEN URINE: NORMAL
WBC # FLD AUTO: 5.46 K/UL
WHITE BLOOD CELLS URINE: 0 /HPF

## 2022-08-04 NOTE — ASSESSMENT
[FreeTextEntry1] : patient with appropriate preventative UTD \par no concerns on exam \par age appropriate counseling given\par shingrix and pneumonia received from pharmacy

## 2022-08-04 NOTE — HISTORY OF PRESENT ILLNESS
[de-identified] : 72 yr old M with a history of cerebral microvascular disease, GERD, hyperlipidemia, presents today for annual exam\par \par Rosuvastatin and renal damage article noted by patient\par checking and liver - \par pt wishes to check testosterone level- concern for fatigue \par Has a history of BPH follows with urology\par [place] in October- \par Dr. Rios \par shingles, tdap and prevnar UTD

## 2022-08-04 NOTE — HEALTH RISK ASSESSMENT
[Former] : Former [No] : In the past 12 months have you used drugs other than those required for medical reasons? No [No falls in past year] : Patient reported no falls in the past year [0] : 2) Feeling down, depressed, or hopeless: Not at all (0) [PHQ-2 Negative - No further assessment needed] : PHQ-2 Negative - No further assessment needed [Patient reported colonoscopy was normal] : Patient reported colonoscopy was normal [With Family] : lives with family [Employed] : employed [Very Good] : ~his/her~  mood as very good [None] : None [Fully functional (bathing, dressing, toileting, transferring, walking, feeding)] : Fully functional (bathing, dressing, toileting, transferring, walking, feeding) [Fully functional (using the telephone, shopping, preparing meals, housekeeping, doing laundry, using] : Fully functional and needs no help or supervision to perform IADLs (using the telephone, shopping, preparing meals, housekeeping, doing laundry, using transportation, managing medications and managing finances) [Reviewed no changes] : Reviewed, no changes [de-identified] : regular exercise  [de-identified] : monitoring diet closely  [[id number]] : 0 [Change in mental status noted] : No change in mental status noted [ColonoscopyDate] : 2020

## 2022-08-08 LAB
TESTOST FREE SERPL-MCNC: 6.2 PG/ML
TESTOST SERPL-MCNC: 460 NG/DL

## 2022-08-18 ENCOUNTER — APPOINTMENT (OUTPATIENT)
Dept: GASTROENTEROLOGY | Facility: CLINIC | Age: 72
End: 2022-08-18

## 2022-08-18 VITALS
SYSTOLIC BLOOD PRESSURE: 131 MMHG | BODY MASS INDEX: 24.55 KG/M2 | OXYGEN SATURATION: 95 % | HEIGHT: 68 IN | TEMPERATURE: 97 F | DIASTOLIC BLOOD PRESSURE: 70 MMHG | WEIGHT: 162 LBS | HEART RATE: 79 BPM

## 2022-08-18 DIAGNOSIS — B19.20 UNSPECIFIED VIRAL HEPATITIS C W/OUT HEPATIC COMA: ICD-10-CM

## 2022-08-18 DIAGNOSIS — K21.00 GASTRO-ESOPHAGEAL REFLUX DISEASE WITH ESOPHAGITIS, WITHOUT BLEEDING: ICD-10-CM

## 2022-08-18 DIAGNOSIS — R19.4 CHANGE IN BOWEL HABIT: ICD-10-CM

## 2022-08-18 DIAGNOSIS — R13.10 DYSPHAGIA, UNSPECIFIED: ICD-10-CM

## 2022-08-18 DIAGNOSIS — Z12.11 ENCOUNTER FOR SCREENING FOR MALIGNANT NEOPLASM OF COLON: ICD-10-CM

## 2022-08-18 PROCEDURE — 99214 OFFICE O/P EST MOD 30 MIN: CPT

## 2022-08-20 PROBLEM — R19.4 CHANGE IN BOWEL HABITS: Status: ACTIVE | Noted: 2022-08-20

## 2022-08-20 PROBLEM — B19.20 HEPATITIS C VIRUS INFECTION WITHOUT HEPATIC COMA, UNSPECIFIED CHRONICITY: Status: ACTIVE | Noted: 2020-02-25

## 2022-08-20 PROBLEM — K21.00 GASTROESOPHAGEAL REFLUX DISEASE WITH ESOPHAGITIS: Status: ACTIVE | Noted: 2020-02-25

## 2022-08-20 PROBLEM — R13.10 DYSPHAGIA: Status: ACTIVE | Noted: 2022-08-20

## 2022-08-20 PROBLEM — Z12.11 COLON CANCER SCREENING: Status: ACTIVE | Noted: 2022-08-20

## 2022-08-20 NOTE — HISTORY OF PRESENT ILLNESS
[FreeTextEntry1] : 72 m h/o migraines, PUD, hemorrhoids, HCV (Tx w/ Dr. German - "moderate" fibrosis per pt- txd w/ Gordon- SVR '16),here for 2 wks ago rectal pressure, associated w/ clear discharge, mild constipation.  No bleeding, abd pain, wt loss, fever.  No tenesmus. \par \par No dysphagia \par    \par Prior testing:\par        EGD for dysphagia 7/2014 mild esophagitis, gastric diverticulum in fundus\par        RUQ sx 7-8/2013 evaluated w/ MRI+ (normal),\par        EGD 2014 RUQ pain - fundic polyp, gastric diverticulum. AFP and celiac panel neg.\par        ER for RUQ pain x2 (7/16/13, and 7/23/13) \par        On both visits, CBC, CMET, amylase/lipase essentially normal.\par        7/16/13 US neg RUQ\par        7/23/13 CT(oral only) IMPRESSION: No evidence of mass lesion or inflammatory process within the abdomen or pelvis. The etiology of this patient's abdominal pain has not been elucidated on the study. Further evaluation is recommended. The urinary bladder is distended and there is a bladder diverticulum. Fecal stasis noted.\par        EGD 11/2011 RUQ pain: gastritis, gastric polyp (xanthoma), neg celiac bx, hiatal hernia, gastric diverticulum\par        Colonoscopy 11/2011 screening, abd pain: diverticulosis f/u 8-10y\par        CT scan +iv abd/pelvis 10/2011: neg\par        Prior w/u 2005-'09 w/ Dr. Solomon WP: CLOneg, Hiatal hernia, gastritis.\par \par 2018 US normal\par 2018 dysphagia w/u\par -EGD 5/2018 tortuous esophagus, mild h. hernia, gastric polyps, fundus diverticulum\par -esophagram 6/2018 Esophagram: Marked persistent tertiary contractions in lower esophagus with corkscrew like appearance and at least one probable diverticulum. Rapid emptying of esophagus. Small hiatus hernia and spontaneous gastroesophageal reflux observed. Relatively large diverticulum of gastric fundus.\par -7/2018 manometry 7/2018 normal w/ Dr. Sierra\par 3/2020 egd for GERD/dysphagia - tortuous esophagus/?trachealized (path NORMAL), small hiatal hernia, fundus diverticulum.  Declined repeat manometry\par 3/2020 colonoscopy for change in BM - diverticlosis, int hemorrhoids, polyp --> 5y f/u\par 5-6/2021 US normal, elastography f2-3, rec US cont'd f/u - 6mo\par \par Soc:  no tobacco or significant EtOH\par FHx: no FHx GI malignancy or IBD\par \par ROS:\par Constitutional:: no weight loss, fevers\par ENT: no deafness\par Eyes: not blind\par Neck: no LN\par Chest: no dyspnea/cough\par Cardiac: no chest pain\par Vascular: no leg swelling\par GI: no abdominal pain, nausea, vomiting, diarrhea, constipation, rectal bleeding, dysphagia, melena unless otherwise noted in HPI\par : no dysuria, dark urine\par Skin: no rashes, jaundice\par Heme: no bleeding\par Endocrine: no DM unless otherwise stated in HPI\par \par Px: (VS noted below)\par General: NAD\par Eyes: anicteric\par Oropharynx:  clear\par Neck: no LN\par Chest: normal respiratory effort\par CVS: regular\par Abd: soft, NT, ND, +BS, no HSM\par Ext: no atrophy\par Neuro: grossly nonfocal\par LANI normal /no masses\par \par Labs/imaging/prior endoscopic results reviewed to the extent available and noted in HPI\par

## 2022-08-20 NOTE — ASSESSMENT
[FreeTextEntry1] : -change in BM - similar to '20 sx that prompted unremarkable colonoscopy at that time - normal exam today - advised fiber supplement and f/u if not improving in next month\par -GERD/dysphagia - esophageal dysmotility but neg manometry - resolved sx - refused repeat manometry\par -HCV - s/p tx - will get US \par -Colon cancer screening - colonoscopy due in 2025\par \par PMD/consultation/hospital notes and Labs/imaging/prior endoscopic results reviewed to extent noted in HPI; and, if procedure code billed on this visit for lab draw, this serves to signify that labs were drawn here in this office.\par

## 2022-08-30 ENCOUNTER — RESULT REVIEW (OUTPATIENT)
Age: 72
End: 2022-08-30

## 2022-11-28 ENCOUNTER — NON-APPOINTMENT (OUTPATIENT)
Age: 72
End: 2022-11-28

## 2023-01-18 ENCOUNTER — APPOINTMENT (OUTPATIENT)
Dept: PULMONOLOGY | Facility: CLINIC | Age: 73
End: 2023-01-18
Payer: MEDICARE

## 2023-01-18 ENCOUNTER — RESULT REVIEW (OUTPATIENT)
Age: 73
End: 2023-01-18

## 2023-01-18 VITALS
HEIGHT: 68 IN | WEIGHT: 160 LBS | DIASTOLIC BLOOD PRESSURE: 80 MMHG | BODY MASS INDEX: 24.25 KG/M2 | SYSTOLIC BLOOD PRESSURE: 140 MMHG | TEMPERATURE: 96.8 F

## 2023-01-18 DIAGNOSIS — J20.9 ACUTE BRONCHITIS, UNSPECIFIED: ICD-10-CM

## 2023-01-18 PROCEDURE — 99204 OFFICE O/P NEW MOD 45 MIN: CPT

## 2023-01-18 NOTE — HISTORY OF PRESENT ILLNESS
[FreeTextEntry1] : Cough chest congestion x2 weeks [de-identified] : 72-year-old male former heavy smoker but quit at age 29. He complains over the past 2 weeks of chest congestion upper chest discomfort persistent nasal drip and green sputum production for 2 weeks. He's been on Keflex 500 t.i.d. for the past 10 days for dental infection. He occasionally has slight wheezing and feels slightly short of breath that time. There is no fever but maybe has had some chills. There is no prior history of lung problems. He tested negative for covid 4 days ago. There are no sick contacts. He gives history of allergy to dust and molds although there's been no help with Flonase and Benadryl. Past medical history significant for anxiety disorder.

## 2023-01-18 NOTE — ASSESSMENT
[FreeTextEntry1] : We probably are dealing with a viral bronchitis with persistent airway inflammation. Patient will be referred for a chest x-ray. We will call patient with results and discuss further.

## 2023-02-10 NOTE — HISTORY OF PRESENT ILLNESS
[FreeTextEntry1] : 72-year-old male former heavy smoker but quit at age 29, cerebral microvascular disease, GERD, hyperlipidemia, with a history of a hiatal hernia. He now presents to thoracic surgery for discussion of possible hiatal hernia treatment/surgery.

## 2023-02-10 NOTE — DATA REVIEWED
[FreeTextEntry1] : CT chest 8/6/2021\par Exam Date: 	  08/06/21					\par Exam: 	CT CHEST					\par Order#:	[id number]					\par INDICATIONS:71-year-old male with history of pulmonary nodules.\par TECHNIQUE: Axial images of the chest were obtained. No intravenous contrast was administered.\par COMPARISON: Outside CT chest study from [facility] 10/31/2006\par FINDINGS:\par Heart: Unremarkable. No evidence of pericardial effusion. Scattered focal coronary artery calcifications, in particular within the left main.\par Mediastinum: Thyroid unremarkable. Scattered, tiny lymph nodes. The esophagus shows evidence of a small hiatal hernia.\par Aorta: The aorta is within normal limits.\par Pulmonary arteries: The diameter of the pulmonary arteries are unremarkable. Is a tiny subpleural nodule in the right lower lobe, measuring 7.2 mm in diameter (7: 379). In comparison to prior, no change at all.\par Lung parenchyma: No evidence of significant pulmonary nodules.\par Pleura: No evidence of pleural effusion.\par Airways: Widely patent airways.\par Upper abdomen: Note is made of a gastric diverticulum. Small hiatal hernia as mentioned above. Gallbladder is unremarkable. No evidence of calcified gallstones. The adrenal glands are within normal limits.\par Soft tissues: Unremarkable soft tissues.\par Bones: Scattered, mild degenerative disc disease in the midthoracic spine.\par IMPRESSION:\par  Tiny, stable subpleural right lower lobe nodule measuring 7.2 mm diameter, stable in comparison to the prior exam. \par \par 3/2020 endoscopy\par IMP small hiatal hernia, incidental fundus diverticulum, tortuous esophagus, colon diverticulosis, int hemorrhoids, polyp \par \par

## 2023-02-13 ENCOUNTER — APPOINTMENT (OUTPATIENT)
Dept: THORACIC SURGERY | Facility: CLINIC | Age: 73
End: 2023-02-13

## 2023-02-14 ENCOUNTER — NON-APPOINTMENT (OUTPATIENT)
Age: 73
End: 2023-02-14

## 2023-02-14 ENCOUNTER — APPOINTMENT (OUTPATIENT)
Dept: SURGERY | Facility: CLINIC | Age: 73
End: 2023-02-14
Payer: MEDICARE

## 2023-02-14 VITALS
BODY MASS INDEX: 25.55 KG/M2 | HEART RATE: 70 BPM | DIASTOLIC BLOOD PRESSURE: 90 MMHG | WEIGHT: 168.6 LBS | HEIGHT: 68 IN | SYSTOLIC BLOOD PRESSURE: 141 MMHG | OXYGEN SATURATION: 97 %

## 2023-02-14 DIAGNOSIS — K46.9 UNSPECIFIED ABDOMINAL HERNIA W/OUT OBSTRUCTION OR GANGRENE: ICD-10-CM

## 2023-02-14 PROCEDURE — 99203 OFFICE O/P NEW LOW 30 MIN: CPT

## 2023-02-14 RX ORDER — CLONAZEPAM 0.5 MG/1
0.5 TABLET ORAL
Qty: 14 | Refills: 0 | Status: COMPLETED | COMMUNITY
Start: 2022-05-26 | End: 2023-02-14

## 2023-02-14 RX ORDER — PREDNISONE 10 MG/1
10 TABLET ORAL
Qty: 20 | Refills: 2 | Status: COMPLETED | COMMUNITY
Start: 2023-01-18 | End: 2023-02-14

## 2023-02-14 NOTE — ASSESSMENT
[FreeTextEntry1] : Referring physician Dr. Peters\par \par Date: 2/14/2023\par \par Reason for referral left groin pain\par \par This is a 72-year-old gentleman who states approximately a month ago he had a severe bout of coughing and since then he has had discomfort and pain in the left groin episodically.  Pain is described to be in the left groin region along the crease.  It is in 1 specific location.  Patient denies having a lump in this area.  He has had no bouts of nausea or vomiting when the pain occurs.  The pain is short lasted.\par \par He is status post open left inguinal hernia repair as well as status post bilateral laparoscopic inguinal hernia repair\par \par Physical examination patient examined erect and supine position.  He is placed through a multitude of Valsalva maneuvers.  He has no obvious inguinal hernias identified in either groin.  Scrotum and testicles are within normal limits.\par \par Patient does have point tenderness in the left groin below the anterior iliac spine but above where 1 would expect to find an inguinal hernia.  He has no pain with flexion internal and external rotation at the hip and he also had no pain with extension at the hip this is all on the left side.  He has no pubalgia.\par \par Impression/plan episodic left groin pain: This is a 72-year-old gentleman who was fine up until approximately a month ago when he had some severe coughing episodes and now has episodic pain in the left groin.  Due to the discomfort he experiences he think he may have a recurrent hernia but based on examination there is no evidence of such.  Patient myself had a very long conversation regarding the pros and cons of obtaining a CAT scan in this setting and the potential implications of the CTs demonstrating a fat-containing inguinal hernia.  Due to the paucity of findings on examination today and my explanation, the patient is opted not to proceed with any further imaging studies at this point.  He will live with his discomfort and take over-the-counter pain meds as needed.  If the pain worsens or he develops a lump in the area he will return to the office to be reevaluated or undergo a CAT scan.\par \par Thank you for allowing me to participate in the care of your patient should you have any questions please feel free to give me a call directly\par \par Sincerely yours,

## 2023-06-19 ENCOUNTER — APPOINTMENT (OUTPATIENT)
Age: 73
End: 2023-06-19
Payer: MEDICARE

## 2023-06-19 ENCOUNTER — LABORATORY RESULT (OUTPATIENT)
Age: 73
End: 2023-06-19

## 2023-06-19 VITALS
HEIGHT: 68 IN | HEART RATE: 81 BPM | OXYGEN SATURATION: 97 % | SYSTOLIC BLOOD PRESSURE: 140 MMHG | BODY MASS INDEX: 24.25 KG/M2 | DIASTOLIC BLOOD PRESSURE: 80 MMHG | WEIGHT: 160 LBS

## 2023-06-19 PROCEDURE — 36415 COLL VENOUS BLD VENIPUNCTURE: CPT

## 2023-06-19 PROCEDURE — 99214 OFFICE O/P EST MOD 30 MIN: CPT | Mod: 25

## 2023-06-19 RX ORDER — ROSUVASTATIN CALCIUM 5 MG/1
5 TABLET, FILM COATED ORAL
Qty: 90 | Refills: 3 | Status: DISCONTINUED | COMMUNITY
Start: 2021-09-20 | End: 2023-06-19

## 2023-06-19 NOTE — HISTORY OF PRESENT ILLNESS
[FreeTextEntry1] : follow up [de-identified] : 73 year of age M presents for follow up\par \par knee, wrist and hip pain- multiple joints are more tender and painful than usual. Located bilaterally. Denies any swelling of joints. Does not recall tick bite. Taking rosuvastatin. Denies any fevers but has some fatigue. No rashes except purpura. Takes martial arts. \par \par bruising on arms/elbows- present for 8 years but has worsened. Previously diagnosed with senile purpura by dermatologist.

## 2023-06-19 NOTE — ASSESSMENT
[FreeTextEntry1] : Multiple joint pains-\par Patient is concerned for underlying rheumatologic illness-\par -suspect based off the patient's history of sports and martial arts this is likely osteoarthritis and normal wear and tear from years of vigorous exercise to these joints\par -He would like an eval with rheumatology\par -We will rule out underlying rheumatologic illnesses as best as possible\par \par \par Low testosterone history\par -Patient is aware low testosterone may be related to age \par -We will assess his testosterone level\par \par Hyperlipidemia\par -Check lipids

## 2023-06-19 NOTE — PHYSICAL EXAM
[Normal] : no joint swelling and grossly normal strength and tone [de-identified] : Full range of motion of multiple extremities noted, tenderness to palpation of the shoulder joints, bilateral medial knees, base of the thumb bilaterally,lateral hips bilaterally

## 2023-06-21 LAB
ALBUMIN SERPL ELPH-MCNC: 4.6 G/DL
ALP BLD-CCNC: 66 U/L
ALT SERPL-CCNC: 16 U/L
ANA SER IF-ACNC: NEGATIVE
ANION GAP SERPL CALC-SCNC: 11 MMOL/L
AST SERPL-CCNC: 21 U/L
BILIRUB SERPL-MCNC: 0.8 MG/DL
BUN SERPL-MCNC: 18 MG/DL
CALCIUM SERPL-MCNC: 9.5 MG/DL
CHLORIDE SERPL-SCNC: 103 MMOL/L
CK SERPL-CCNC: 71 U/L
CO2 SERPL-SCNC: 28 MMOL/L
CREAT SERPL-MCNC: 0.86 MG/DL
CRP SERPL-MCNC: <3 MG/L
EGFR: 91 ML/MIN/1.73M2
ERYTHROCYTE [SEDIMENTATION RATE] IN BLOOD BY WESTERGREN METHOD: 9 MM/HR
GLUCOSE SERPL-MCNC: 110 MG/DL
POTASSIUM SERPL-SCNC: 4.8 MMOL/L
PROT SERPL-MCNC: 6.8 G/DL
RHEUMATOID FACT SER QL: <10 IU/ML
SODIUM SERPL-SCNC: 141 MMOL/L
TSH SERPL-ACNC: 1.33 UIU/ML

## 2023-06-22 LAB
TESTOST FREE SERPL-MCNC: 4.3 PG/ML
TESTOST SERPL-MCNC: 387 NG/DL

## 2023-07-10 DIAGNOSIS — R79.89 OTHER SPECIFIED ABNORMAL FINDINGS OF BLOOD CHEMISTRY: ICD-10-CM

## 2023-07-13 LAB
TESTOST FREE SERPL-MCNC: 12.9 PG/ML
TESTOST SERPL-MCNC: 439 NG/DL

## 2023-07-20 ENCOUNTER — APPOINTMENT (OUTPATIENT)
Dept: RHEUMATOLOGY | Facility: CLINIC | Age: 73
End: 2023-07-20
Payer: MEDICARE

## 2023-07-20 ENCOUNTER — RESULT REVIEW (OUTPATIENT)
Age: 73
End: 2023-07-20

## 2023-07-20 VITALS
OXYGEN SATURATION: 98 % | HEIGHT: 68 IN | BODY MASS INDEX: 24.55 KG/M2 | DIASTOLIC BLOOD PRESSURE: 74 MMHG | HEART RATE: 83 BPM | WEIGHT: 162 LBS | SYSTOLIC BLOOD PRESSURE: 126 MMHG

## 2023-07-20 DIAGNOSIS — M79.642 PAIN IN RIGHT HAND: ICD-10-CM

## 2023-07-20 DIAGNOSIS — M79.10 MYALGIA, UNSPECIFIED SITE: ICD-10-CM

## 2023-07-20 DIAGNOSIS — M79.641 PAIN IN RIGHT HAND: ICD-10-CM

## 2023-07-20 DIAGNOSIS — M25.50 PAIN IN UNSPECIFIED JOINT: ICD-10-CM

## 2023-07-20 PROCEDURE — 99204 OFFICE O/P NEW MOD 45 MIN: CPT

## 2023-07-20 RX ORDER — VENLAFAXINE HYDROCHLORIDE 75 MG/1
75 CAPSULE, EXTENDED RELEASE ORAL
Qty: 21 | Refills: 0 | Status: DISCONTINUED | COMMUNITY
Start: 2022-07-19 | End: 2023-07-20

## 2023-07-20 NOTE — HISTORY OF PRESENT ILLNESS
[FreeTextEntry1] : 74yo M with PMHx of BPH, HLD, knee osteoarthritis in the office for evaluation\par \par History\par diffuse arthralgias\par pain over knee, wrist, elbows, shoulders\par for around 6 weeks\par variable intensity but symptoms mostly daily\par no synovitis or dactylitis described\par no fever, chills or intentional weight loss\par no skin rashes\par some degree of neck discomfort\par tylenol, advil, voltaren gel help\par on statins, switch to pravastatin by pcp\par extensive blood work reviewed

## 2023-07-20 NOTE — PHYSICAL EXAM
[General Appearance - Alert] : alert [General Appearance - In No Acute Distress] : in no acute distress [Sclera] : the sclera and conjunctiva were normal [Neck Appearance] : the appearance of the neck was normal [Auscultation Breath Sounds / Voice Sounds] : lungs were clear to auscultation bilaterally [Heart Rate And Rhythm] : heart rate was normal and rhythm regular [Heart Sounds] : normal S1 and S2 [FreeTextEntry1] : multiple tender point over wrist, knees and forearm area. joint fORM, no restriction [] : no rash [No Focal Deficits] : no focal deficits [Oriented To Time, Place, And Person] : oriented to person, place, and time

## 2023-07-27 ENCOUNTER — RESULT REVIEW (OUTPATIENT)
Age: 73
End: 2023-07-27

## 2023-07-27 ENCOUNTER — NON-APPOINTMENT (OUTPATIENT)
Age: 73
End: 2023-07-27

## 2023-07-27 ENCOUNTER — TRANSCRIPTION ENCOUNTER (OUTPATIENT)
Age: 73
End: 2023-07-27

## 2023-07-28 ENCOUNTER — RESULT REVIEW (OUTPATIENT)
Age: 73
End: 2023-07-28

## 2023-07-30 ENCOUNTER — TRANSCRIPTION ENCOUNTER (OUTPATIENT)
Age: 73
End: 2023-07-30

## 2023-08-01 ENCOUNTER — RESULT REVIEW (OUTPATIENT)
Age: 73
End: 2023-08-01

## 2023-08-02 ENCOUNTER — RESULT REVIEW (OUTPATIENT)
Age: 73
End: 2023-08-02

## 2023-08-02 ENCOUNTER — NON-APPOINTMENT (OUTPATIENT)
Age: 73
End: 2023-08-02

## 2023-08-04 ENCOUNTER — APPOINTMENT (OUTPATIENT)
Dept: FAMILY MEDICINE | Facility: CLINIC | Age: 73
End: 2023-08-04
Payer: MEDICARE

## 2023-08-04 DIAGNOSIS — I67.89 OTHER CEREBROVASCULAR DISEASE: ICD-10-CM

## 2023-08-04 DIAGNOSIS — M00.9 PYOGENIC ARTHRITIS, UNSPECIFIED: ICD-10-CM

## 2023-08-04 PROCEDURE — 99496 TRANSJ CARE MGMT HIGH F2F 7D: CPT | Mod: 95

## 2023-08-04 NOTE — HISTORY OF PRESENT ILLNESS
[Post-hospitalization from ___ Hospital] : Post-hospitalization from [unfilled] Hospital [Admitted on: ___] : The patient was admitted on [unfilled] [Discharged on ___] : discharged on [unfilled] [Discharge Summary] : discharge summary [Pertinent Labs] : pertinent labs [Radiology Findings] : radiology findings [Discharge Med List] : discharge medication list [Med Reconciliation] : medication reconciliation has been completed [Patient Contacted By: ____] : and contacted by [unfilled] [de-identified] : Hands surgeon  - hands are painful  [FreeTextEntry2] : 73 yr old M BPH, hyperlipidemia presents for hfu Cellulitis/ septic arthritis of hand s/p ID with Dr. stanford - cephalexin -  has follow up  - septic arthritis is causing pain but continues with appropriate pain management and IV abx   no fevers, nausea, vomiting, diarrhea  Hyperlipidemia Had statin held and has noted a sig improvement in joint pain off statin  would like ot re evaluate the calcium score to consider his burden  [Home] : at home, [unfilled] , at the time of the visit. [Medical Office: ([facility])___] : at the medical office located in  [Verbal consent obtained from patient] : the patient, [unfilled]

## 2023-08-09 ENCOUNTER — RESULT REVIEW (OUTPATIENT)
Age: 73
End: 2023-08-09

## 2023-08-12 ENCOUNTER — RESULT REVIEW (OUTPATIENT)
Age: 73
End: 2023-08-12

## 2023-08-15 RX ORDER — PRAVASTATIN SODIUM 80 MG/1
80 TABLET ORAL
Qty: 90 | Refills: 0 | Status: DISCONTINUED | COMMUNITY
Start: 2023-06-19 | End: 2023-08-15

## 2023-08-16 ENCOUNTER — RESULT REVIEW (OUTPATIENT)
Age: 73
End: 2023-08-16

## 2023-08-28 ENCOUNTER — RESULT REVIEW (OUTPATIENT)
Age: 73
End: 2023-08-28

## 2023-10-10 LAB
CHOLEST SERPL-MCNC: 151 MG/DL
HDLC SERPL-MCNC: 59 MG/DL
LDLC SERPL CALC-MCNC: 79 MG/DL
NONHDLC SERPL-MCNC: 92 MG/DL
TESTOST FREE SERPL-MCNC: 12.6 PG/ML
TESTOST SERPL-MCNC: 515 NG/DL
TRIGL SERPL-MCNC: 65 MG/DL

## 2023-11-28 ENCOUNTER — APPOINTMENT (OUTPATIENT)
Age: 73
End: 2023-11-28
Payer: MEDICARE

## 2023-11-28 VITALS
BODY MASS INDEX: 23.79 KG/M2 | WEIGHT: 157 LBS | HEIGHT: 68 IN | OXYGEN SATURATION: 97 % | HEART RATE: 80 BPM | SYSTOLIC BLOOD PRESSURE: 130 MMHG | DIASTOLIC BLOOD PRESSURE: 90 MMHG

## 2023-11-28 DIAGNOSIS — Z00.00 ENCOUNTER FOR GENERAL ADULT MEDICAL EXAMINATION W/OUT ABNORMAL FINDINGS: ICD-10-CM

## 2023-11-28 DIAGNOSIS — N40.0 BENIGN PROSTATIC HYPERPLASIA WITHOUT LOWER URINARY TRACT SYMPMS: ICD-10-CM

## 2023-11-28 DIAGNOSIS — R73.09 OTHER ABNORMAL GLUCOSE: ICD-10-CM

## 2023-11-28 DIAGNOSIS — E78.5 HYPERLIPIDEMIA, UNSPECIFIED: ICD-10-CM

## 2023-11-28 PROCEDURE — 36415 COLL VENOUS BLD VENIPUNCTURE: CPT

## 2023-11-28 PROCEDURE — G0439: CPT

## 2023-12-04 LAB
ALBUMIN SERPL ELPH-MCNC: 4.9 G/DL
ALP BLD-CCNC: 61 U/L
ALT SERPL-CCNC: 19 U/L
ANION GAP SERPL CALC-SCNC: 11 MMOL/L
APPEARANCE: CLEAR
AST SERPL-CCNC: 21 U/L
BASOPHILS # BLD AUTO: 0.04 K/UL
BASOPHILS NFR BLD AUTO: 0.7 %
BILIRUB SERPL-MCNC: 0.8 MG/DL
BILIRUBIN URINE: NEGATIVE
BLOOD URINE: NEGATIVE
BUN SERPL-MCNC: 15 MG/DL
CALCIUM SERPL-MCNC: 10 MG/DL
CHLORIDE SERPL-SCNC: 101 MMOL/L
CHOLEST SERPL-MCNC: 180 MG/DL
CO2 SERPL-SCNC: 29 MMOL/L
COLOR: NORMAL
CREAT SERPL-MCNC: 1.1 MG/DL
EGFR: 71 ML/MIN/1.73M2
EOSINOPHIL # BLD AUTO: 0.09 K/UL
EOSINOPHIL NFR BLD AUTO: 1.6 %
ESTIMATED AVERAGE GLUCOSE: 114 MG/DL
GLUCOSE QUALITATIVE U: NEGATIVE MG/DL
GLUCOSE SERPL-MCNC: 110 MG/DL
HBA1C MFR BLD HPLC: 5.6 %
HCT VFR BLD CALC: 48.6 %
HDLC SERPL-MCNC: 72 MG/DL
HGB BLD-MCNC: 16.2 G/DL
IMM GRANULOCYTES NFR BLD AUTO: 0.2 %
KETONES URINE: NEGATIVE MG/DL
LDLC SERPL CALC-MCNC: 95 MG/DL
LEUKOCYTE ESTERASE URINE: NEGATIVE
LYMPHOCYTES # BLD AUTO: 1.66 K/UL
LYMPHOCYTES NFR BLD AUTO: 29.9 %
MAN DIFF?: NORMAL
MCHC RBC-ENTMCNC: 30.8 PG
MCHC RBC-ENTMCNC: 33.3 GM/DL
MCV RBC AUTO: 92.4 FL
MONOCYTES # BLD AUTO: 0.5 K/UL
MONOCYTES NFR BLD AUTO: 9 %
NEUTROPHILS # BLD AUTO: 3.26 K/UL
NEUTROPHILS NFR BLD AUTO: 58.6 %
NITRITE URINE: NEGATIVE
NONHDLC SERPL-MCNC: 108 MG/DL
PH URINE: 6
PLATELET # BLD AUTO: 257 K/UL
POTASSIUM SERPL-SCNC: 4.6 MMOL/L
PROT SERPL-MCNC: 7.3 G/DL
PROTEIN URINE: NEGATIVE MG/DL
PSA FREE FLD-MCNC: 21 %
PSA FREE SERPL-MCNC: 0.21 NG/ML
PSA SERPL-MCNC: 1.01 NG/ML
RBC # BLD: 5.26 M/UL
RBC # FLD: 12.6 %
SODIUM SERPL-SCNC: 141 MMOL/L
SPECIFIC GRAVITY URINE: 1.02
TRIGL SERPL-MCNC: 71 MG/DL
UROBILINOGEN URINE: 0.2 MG/DL
WBC # FLD AUTO: 5.56 K/UL

## 2023-12-12 DIAGNOSIS — Z71.84 ENC FOR HEALTH COUNSELING RELATED TO TRAVEL: ICD-10-CM

## 2023-12-12 RX ORDER — CHLOROQUINE PHOSPHATE 500 MG/1
500 TABLET ORAL
Qty: 12 | Refills: 0 | Status: ACTIVE | COMMUNITY
Start: 2023-12-12 | End: 1900-01-01

## 2023-12-20 LAB
HBV SURFACE AB SER QL: REACTIVE
HEPATITIS A IGG ANTIBODY: REACTIVE

## 2023-12-21 ENCOUNTER — RX RENEWAL (OUTPATIENT)
Age: 73
End: 2023-12-21

## 2023-12-21 DIAGNOSIS — Z29.89 ENCOUNTER. FOR OTHER SPECIFIED PROPHYLACTIC MEASURES: ICD-10-CM

## 2023-12-21 LAB — G6PD SER-CCNC: 13 U/G HGB

## 2023-12-21 RX ORDER — TAFENOQUINE 100 MG/1
100 TABLET, FILM COATED ORAL
Qty: 16 | Refills: 0 | Status: ACTIVE | COMMUNITY
Start: 2023-12-21 | End: 1900-01-01

## 2024-01-05 RX ORDER — SALMONELLA TYPHI TY2 VI POLYSACCHARIDE ANTIGEN 25 UG/.5ML
25 INJECTION, SOLUTION INTRAMUSCULAR
Qty: 1 | Refills: 0 | Status: ACTIVE | COMMUNITY
Start: 2024-01-05 | End: 1900-01-01

## 2024-01-05 RX ORDER — ATOVAQUONE AND PROGUANIL HYDROCHLORIDE 250; 100 MG/1; MG/1
250-100 TABLET, FILM COATED ORAL DAILY
Qty: 30 | Refills: 0 | Status: ACTIVE | COMMUNITY
Start: 2024-01-05 | End: 1900-01-01

## 2024-03-19 ENCOUNTER — APPOINTMENT (OUTPATIENT)
Dept: FAMILY MEDICINE | Facility: CLINIC | Age: 74
End: 2024-03-19
Payer: MEDICARE

## 2024-03-19 ENCOUNTER — RESULT REVIEW (OUTPATIENT)
Age: 74
End: 2024-03-19

## 2024-03-19 VITALS
TEMPERATURE: 97.6 F | WEIGHT: 158 LBS | HEIGHT: 68 IN | OXYGEN SATURATION: 94 % | BODY MASS INDEX: 23.95 KG/M2 | SYSTOLIC BLOOD PRESSURE: 140 MMHG | HEART RATE: 92 BPM | DIASTOLIC BLOOD PRESSURE: 80 MMHG

## 2024-03-19 DIAGNOSIS — J18.9 PNEUMONIA, UNSPECIFIED ORGANISM: ICD-10-CM

## 2024-03-19 PROCEDURE — 36415 COLL VENOUS BLD VENIPUNCTURE: CPT

## 2024-03-19 PROCEDURE — G2211 COMPLEX E/M VISIT ADD ON: CPT

## 2024-03-19 PROCEDURE — 99214 OFFICE O/P EST MOD 30 MIN: CPT

## 2024-03-19 RX ORDER — AZITHROMYCIN 250 MG/1
250 TABLET, FILM COATED ORAL
Qty: 1 | Refills: 0 | Status: ACTIVE | COMMUNITY
Start: 2024-03-19 | End: 1900-01-01

## 2024-03-19 RX ORDER — CEFPODOXIME PROXETIL 200 MG/1
200 TABLET, FILM COATED ORAL
Qty: 10 | Refills: 0 | Status: ACTIVE | COMMUNITY
Start: 2024-03-19 | End: 1900-01-01

## 2024-03-19 NOTE — HISTORY OF PRESENT ILLNESS
[de-identified] :   Patient came back from [facility]  pateint started noting nasal congestion- sore throat  pateint has green flegm and notes he feels unwell feeling fever 100  feels like he has bronchitis and sinusitis  returned on March 12 and symtpoms started on the 13th or 14th sob and some wheezing noted   no nausea or vomting  just feeling unwell   took 2 covid tests neg   patient had bronchitis on the [place] he went in [place]

## 2024-03-19 NOTE — ASSESSMENT
[FreeTextEntry1] : 73-year-old male with a history of fevers congestion cough after recent travel Suspect a pneumonia Given the patient has had persistent fevers which is responding to acetaminophen and ibuprofen recommend that the patient have antibiotic treatments Recommend a chest x-ray Recommending at this time that the patient start azithromycin and cefpodoxime Patient had a rash with erythromycin and has taken a third-generation cephalosporin in the past without issue Discussed with the patient he is aware of the history of allergy Will trial and monitor  Ordered sputum as well given the recent travel is associated Given exam however suspect that this is a pneumonia

## 2024-03-19 NOTE — PHYSICAL EXAM
[de-identified] : Rhonchorous lung sounds throughout especially on the left side No accessory muscle use no respiratory distress, producing greenish mucus on exam today [Normal] : normal rate, regular rhythm, normal S1 and S2 and no murmur heard

## 2024-03-21 LAB
ALBUMIN SERPL ELPH-MCNC: 4.5 G/DL
ALP BLD-CCNC: 81 U/L
ALT SERPL-CCNC: 16 U/L
ANION GAP SERPL CALC-SCNC: 13 MMOL/L
AST SERPL-CCNC: 21 U/L
BASOPHILS # BLD AUTO: 0.06 K/UL
BASOPHILS NFR BLD AUTO: 0.7 %
BILIRUB SERPL-MCNC: 0.3 MG/DL
BUN SERPL-MCNC: 12 MG/DL
CALCIUM SERPL-MCNC: 9.8 MG/DL
CHLORIDE SERPL-SCNC: 101 MMOL/L
CO2 SERPL-SCNC: 28 MMOL/L
CREAT SERPL-MCNC: 1.16 MG/DL
CRP SERPL-MCNC: 16 MG/L
EGFR: 67 ML/MIN/1.73M2
EOSINOPHIL # BLD AUTO: 0.29 K/UL
EOSINOPHIL NFR BLD AUTO: 3.2 %
ERYTHROCYTE [SEDIMENTATION RATE] IN BLOOD BY WESTERGREN METHOD: 21 MM/HR
GLUCOSE SERPL-MCNC: 126 MG/DL
HCT VFR BLD CALC: 45 %
HGB BLD-MCNC: 15.1 G/DL
IMM GRANULOCYTES NFR BLD AUTO: 0.4 %
LYMPHOCYTES # BLD AUTO: 1.5 K/UL
LYMPHOCYTES NFR BLD AUTO: 16.7 %
MAN DIFF?: NORMAL
MCHC RBC-ENTMCNC: 31.6 PG
MCHC RBC-ENTMCNC: 33.6 GM/DL
MCV RBC AUTO: 94.1 FL
MONOCYTES # BLD AUTO: 0.82 K/UL
MONOCYTES NFR BLD AUTO: 9.1 %
NEUTROPHILS # BLD AUTO: 6.27 K/UL
NEUTROPHILS NFR BLD AUTO: 69.9 %
PLATELET # BLD AUTO: 217 K/UL
POTASSIUM SERPL-SCNC: 4.6 MMOL/L
PROT SERPL-MCNC: 7 G/DL
RBC # BLD: 4.78 M/UL
RBC # FLD: 12.2 %
SODIUM SERPL-SCNC: 141 MMOL/L
WBC # FLD AUTO: 8.98 K/UL

## 2024-03-23 LAB — BACTERIA SPT CULT: ABNORMAL

## 2024-03-25 ENCOUNTER — NON-APPOINTMENT (OUTPATIENT)
Age: 74
End: 2024-03-25

## 2024-03-25 DIAGNOSIS — J40 BRONCHITIS, NOT SPECIFIED AS ACUTE OR CHRONIC: ICD-10-CM

## 2024-03-25 RX ORDER — PREDNISONE 20 MG/1
20 TABLET ORAL DAILY
Qty: 10 | Refills: 0 | Status: ACTIVE | COMMUNITY
Start: 2024-03-25 | End: 1900-01-01

## 2024-04-16 ENCOUNTER — RX RENEWAL (OUTPATIENT)
Age: 74
End: 2024-04-16

## 2024-04-16 RX ORDER — ALBUTEROL SULFATE 90 UG/1
108 (90 BASE) INHALANT RESPIRATORY (INHALATION)
Qty: 1 | Refills: 0 | Status: ACTIVE | COMMUNITY
Start: 2024-03-19 | End: 1900-01-01

## 2024-06-28 ENCOUNTER — RX RENEWAL (OUTPATIENT)
Age: 74
End: 2024-06-28

## 2024-07-08 ENCOUNTER — APPOINTMENT (OUTPATIENT)
Age: 74
End: 2024-07-08
Payer: MEDICARE

## 2024-07-08 VITALS
DIASTOLIC BLOOD PRESSURE: 90 MMHG | HEIGHT: 68 IN | HEART RATE: 97 BPM | OXYGEN SATURATION: 98 % | SYSTOLIC BLOOD PRESSURE: 130 MMHG

## 2024-07-08 DIAGNOSIS — E78.5 HYPERLIPIDEMIA, UNSPECIFIED: ICD-10-CM

## 2024-07-08 DIAGNOSIS — R79.89 OTHER SPECIFIED ABNORMAL FINDINGS OF BLOOD CHEMISTRY: ICD-10-CM

## 2024-07-08 PROCEDURE — 99214 OFFICE O/P EST MOD 30 MIN: CPT

## 2024-07-08 PROCEDURE — 36415 COLL VENOUS BLD VENIPUNCTURE: CPT

## 2024-07-13 LAB
ALBUMIN SERPL ELPH-MCNC: 4.6 G/DL
ALP BLD-CCNC: 78 U/L
ALT SERPL-CCNC: 27 U/L
ANION GAP SERPL CALC-SCNC: 14 MMOL/L
BILIRUB SERPL-MCNC: 0.5 MG/DL
BUN SERPL-MCNC: 18 MG/DL
CHOLEST SERPL-MCNC: 191 MG/DL
CO2 SERPL-SCNC: 22 MMOL/L
CREAT SERPL-MCNC: 0.97 MG/DL
EGFR: 82 ML/MIN/1.73M2
GLUCOSE SERPL-MCNC: 107 MG/DL
HDLC SERPL-MCNC: 68 MG/DL
LDLC SERPL CALC-MCNC: 101 MG/DL
NONHDLC SERPL-MCNC: 123 MG/DL
POTASSIUM SERPL-SCNC: 4.5 MMOL/L
PROT SERPL-MCNC: 7 G/DL
SODIUM SERPL-SCNC: 140 MMOL/L
TESTOST FREE SERPL-MCNC: 4.3 PG/ML
TESTOST SERPL-MCNC: 463 NG/DL
TRIGL SERPL-MCNC: 125 MG/DL

## 2024-08-20 ENCOUNTER — NON-APPOINTMENT (OUTPATIENT)
Age: 74
End: 2024-08-20

## 2024-08-21 ENCOUNTER — APPOINTMENT (OUTPATIENT)
Dept: GASTROENTEROLOGY | Facility: CLINIC | Age: 74
End: 2024-08-21
Payer: MEDICARE

## 2024-08-21 VITALS
HEART RATE: 64 BPM | DIASTOLIC BLOOD PRESSURE: 80 MMHG | WEIGHT: 163 LBS | OXYGEN SATURATION: 96 % | HEIGHT: 68 IN | BODY MASS INDEX: 24.71 KG/M2 | SYSTOLIC BLOOD PRESSURE: 130 MMHG

## 2024-08-21 DIAGNOSIS — Z12.11 ENCOUNTER FOR SCREENING FOR MALIGNANT NEOPLASM OF COLON: ICD-10-CM

## 2024-08-21 DIAGNOSIS — R13.10 DYSPHAGIA, UNSPECIFIED: ICD-10-CM

## 2024-08-21 DIAGNOSIS — B19.20 UNSPECIFIED VIRAL HEPATITIS C W/OUT HEPATIC COMA: ICD-10-CM

## 2024-08-21 PROCEDURE — 99214 OFFICE O/P EST MOD 30 MIN: CPT

## 2024-08-21 RX ORDER — PAROXETINE HYDROCHLORIDE 40 MG/1
40 TABLET, FILM COATED ORAL
Refills: 0 | Status: ACTIVE | COMMUNITY

## 2024-08-21 RX ORDER — BUSPIRONE HYDROCHLORIDE 30 MG/1
30 TABLET ORAL
Refills: 0 | Status: ACTIVE | COMMUNITY

## 2024-08-21 NOTE — HISTORY OF PRESENT ILLNESS
[FreeTextEntry1] : 74 m h/o migraines, PUD, hemorrhoids, HCV (Tx w/ Dr. German - "moderate" fibrosis per pt- txd w/ [facility]- SVR '16),here for 2-3 wks food sticking while swallowing - solids only .  Hasn't had sx for years.  On PPI with GERD controlled. No wt loss.  No abd pain, change in BM, rectal bleeding.   LFTs normal at PMD last month Prior testing:        EGD for dysphagia 7/2014 mild esophagitis, gastric diverticulum in fundus        RUQ sx 7-8/2013 evaluated w/ MRI+ (normal),        EGD 2014 RUQ pain - fundic polyp, gastric diverticulum. AFP and celiac panel neg.        ER for RUQ pain x2 (7/16/13, and 7/23/13)         On both visits, CBC, CMET, amylase/lipase essentially normal.        7/16/13 US neg RUQ        7/23/13 CT(oral only) IMPRESSION: No evidence of mass lesion or inflammatory process within the abdomen or pelvis. The etiology of this patient's abdominal pain has not been elucidated on the study. Further evaluation is recommended. The urinary bladder is distended and there is a bladder diverticulum. Fecal stasis noted.        EGD 11/2011 RUQ pain: gastritis, gastric polyp (xanthoma), neg celiac bx, hiatal hernia, gastric diverticulum        Colonoscopy 11/2011 screening, abd pain: diverticulosis f/u 8-10y        CT scan +iv abd/pelvis 10/2011: neg        Prior w/u 2005-'09 w/ Dr. Solomon WP: CLOneg, Hiatal hernia, gastritis.  2018 US normal 2018 dysphagia w/u -EGD 5/2018 tortuous esophagus, mild h. hernia, gastric polyps, fundus diverticulum -esophagram 6/2018 Esophagram: Marked persistent tertiary contractions in lower esophagus with corkscrew like appearance and at least one probable diverticulum. Rapid emptying of esophagus. Small hiatus hernia and spontaneous gastroesophageal reflux observed. Relatively large diverticulum of gastric fundus. -7/2018 manometry 7/2018 normal w/ Dr. Sierra 3/2020 egd for GERD/dysphagia - tortuous esophagus/?trachealized (path NORMAL), small hiatal hernia, fundus diverticulum.  Declined repeat manometry 3/2020 colonoscopy for change in BM - diverticlosis, int hemorrhoids, polyp --> 5y f/u 5-6/2021 US normal, elastography f2-3, rec US cont'd f/u - 6mo 9/2022 US normal - plan was repeat fibroscan '23  Soc:  no tobacco or significant EtOH FHx: no FHx GI malignancy or IBD  ROS: Constitutional:: no weight loss, fevers ENT: no deafness Eyes: not blind Neck: no LN Chest: no dyspnea/cough Cardiac: no chest pain Vascular: no leg swelling GI: no abdominal pain, nausea, vomiting, diarrhea, constipation, rectal bleeding, dysphagia, melena unless otherwise noted in HPI : no dysuria, dark urine Skin: no rashes, jaundice Heme: no bleeding Endocrine: no DM unless otherwise stated in HPI  Px: (VS noted below) General: NAD Eyes: anicteric Oropharynx:  clear Neck: no LN Chest: normal respiratory effort CVS: regular Abd: soft, NT, ND, +BS, no HSM Ext: no atrophy Neuro: grossly nonfocal  Labs/imaging/prior endoscopic results reviewed to the extent available and noted in HPI

## 2024-08-21 NOTE — ASSESSMENT
[FreeTextEntry1] : - dysphagia - repeat egd to eval for stricture, consider repeat manometry; cont ppi -HCV - s/p tx - will get fibroscan as planned -Colon cancer screening - colonoscopy due in 2025  PMD/consultation/hospital notes and Labs/imaging/prior endoscopic results reviewed to extent noted in HPI; and, if procedure code billed on this visit for lab draw, this serves to signify that labs were drawn here in this office.

## 2024-08-25 ENCOUNTER — RESULT REVIEW (OUTPATIENT)
Age: 74
End: 2024-08-25

## 2024-08-26 ENCOUNTER — APPOINTMENT (OUTPATIENT)
Dept: GASTROENTEROLOGY | Facility: HOSPITAL | Age: 74
End: 2024-08-26

## 2024-09-11 ENCOUNTER — APPOINTMENT (OUTPATIENT)
Dept: GASTROENTEROLOGY | Facility: CLINIC | Age: 74
End: 2024-09-11
Payer: MEDICARE

## 2024-09-11 VITALS
OXYGEN SATURATION: 98 % | SYSTOLIC BLOOD PRESSURE: 120 MMHG | WEIGHT: 165 LBS | BODY MASS INDEX: 25.01 KG/M2 | DIASTOLIC BLOOD PRESSURE: 80 MMHG | HEIGHT: 68 IN | HEART RATE: 70 BPM

## 2024-09-11 DIAGNOSIS — R19.4 CHANGE IN BOWEL HABIT: ICD-10-CM

## 2024-09-11 DIAGNOSIS — Z12.11 ENCOUNTER FOR SCREENING FOR MALIGNANT NEOPLASM OF COLON: ICD-10-CM

## 2024-09-11 DIAGNOSIS — R13.10 DYSPHAGIA, UNSPECIFIED: ICD-10-CM

## 2024-09-11 DIAGNOSIS — B19.20 UNSPECIFIED VIRAL HEPATITIS C W/OUT HEPATIC COMA: ICD-10-CM

## 2024-09-11 PROCEDURE — G2211 COMPLEX E/M VISIT ADD ON: CPT

## 2024-09-11 PROCEDURE — 99214 OFFICE O/P EST MOD 30 MIN: CPT

## 2024-09-11 PROCEDURE — 36415 COLL VENOUS BLD VENIPUNCTURE: CPT

## 2024-09-11 NOTE — HISTORY OF PRESENT ILLNESS
[FreeTextEntry1] : 74 m h/o migraines, PUD, hemorrhoids, HCV (Tx w/ Dr. German - "moderate" fibrosis per pt- txd w/ [facility]- SVR '16), seen recentl yfor dysphagia - 2-3 wks food sticking while swallowing - solids only .  Hasn't had sx for years. (prior neg w/u as below) - EGD done last wk - 8/26/24 EGD - tortuous esophagus, neg esoph bx, mild h. hernia, fundus diverticulum.  This symptom is better but now lots of belching and some loose stools 2-3x/d. States this started about a month ago, assoc. w/ lower abd bloating, cramping but no bleeding.  LFTs normal at PMD last month Prior testing:        EGD for dysphagia 7/2014 mild esophagitis, gastric diverticulum in fundus        RUQ sx 7-8/2013 evaluated w/ MRI+ (normal),        EGD 2014 RUQ pain - fundic polyp, gastric diverticulum. AFP and celiac panel neg.        ER for RUQ pain x2 (7/16/13, and 7/23/13)         On both visits, CBC, CMET, amylase/lipase essentially normal.        7/16/13 US neg RUQ        7/23/13 CT(oral only) IMPRESSION: No evidence of mass lesion or inflammatory process within the abdomen or pelvis. The etiology of this patient's abdominal pain has not been elucidated on the study. Further evaluation is recommended. The urinary bladder is distended and there is a bladder diverticulum. Fecal stasis noted.        EGD 11/2011 RUQ pain: gastritis, gastric polyp (xanthoma), neg celiac bx, hiatal hernia, gastric diverticulum        Colonoscopy 11/2011 screening, abd pain: diverticulosis f/u 8-10y        CT scan +iv abd/pelvis 10/2011: neg        Prior w/u 2005-'09 w/ Dr. Solomon WP: CLOneg, Hiatal hernia, gastritis.  2018 US normal 2018 dysphagia w/u -EGD 5/2018 tortuous esophagus, mild h. hernia, gastric polyps, fundus diverticulum -esophagram 6/2018 Esophagram: Marked persistent tertiary contractions in lower esophagus with corkscrew like appearance and at least one probable diverticulum. Rapid emptying of esophagus. Small hiatus hernia and spontaneous gastroesophageal reflux observed. Relatively large diverticulum of gastric fundus. -7/2018 manometry 7/2018 normal w/ Dr. Sierra 3/2020 egd for GERD/dysphagia - tortuous esophagus/?trachealized (path NORMAL), small hiatal hernia, fundus diverticulum.  Declined repeat manometry 3/2020 colonoscopy for change in BM - diverticlosis, int hemorrhoids, polyp --> 5y f/u 5-6/2021 US normal, elastography f2-3, rec US cont'd f/u - 6mo 9/2022 US normal - plan was repeat fibroscan '23  Soc:  no tobacco or significant EtOH FHx: no FHx GI malignancy or IBD  ROS: Constitutional:: no weight loss, fevers ENT: no deafness Eyes: not blind Neck: no LN Chest: no dyspnea/cough Cardiac: no chest pain Vascular: no leg swelling GI: no abdominal pain, nausea, vomiting, diarrhea, constipation, rectal bleeding, dysphagia, melena unless otherwise noted in HPI : no dysuria, dark urine Skin: no rashes, jaundice Heme: no bleeding Endocrine: no DM unless otherwise stated in HPI  Px: (VS noted below) General: NAD Eyes: anicteric Oropharynx:  clear Neck: no LN Chest: normal respiratory effort CVS: regular Abd: soft, NT, ND, +BS, no HSM Ext: no atrophy Neuro: grossly nonfocal  Labs/imaging/prior endoscopic results reviewed to the extent available and noted in HPI

## 2024-09-11 NOTE — CONSULT LETTER
[FreeTextEntry1] : Dear Dr. AMA RIBEIRO ,  I had the pleasure of evaluating your patient,  ELROY MAN.  Please refer to my note below.  Thank you very much for allowing me to participate in the care of this patient.  If you have any questions, please do not hesitate to contact me.  Sincerely,   En Sanders MD

## 2024-09-11 NOTE — ASSESSMENT
[FreeTextEntry1] : -change in BMs - suspect some functional component of sx - will send stool studies for now and labs - consider further testing if sx persist. -dysphagia - consider repeat manometry if sx don't cont to improve -HCV - s/p tx - will get fibroscan as planned -Colon cancer screening - colonoscopy due in 2025  PMD/consultation/hospital notes and Labs/imaging/prior endoscopic results reviewed to extent noted in HPI; and, if procedure code billed on this visit for lab draw, this serves to signify that labs were drawn here in this office.

## 2024-09-11 NOTE — REASON FOR VISIT
[Follow-up] : a follow-up of an existing diagnosis [FreeTextEntry1] : abdominal discomfort, loose stools

## 2024-09-18 LAB
ALBUMIN SERPL ELPH-MCNC: 4.4 G/DL
ALP BLD-CCNC: 75 U/L
ALT SERPL-CCNC: 14 U/L
ANION GAP SERPL CALC-SCNC: 10 MMOL/L
AST SERPL-CCNC: 18 U/L
BASOPHILS # BLD AUTO: 0.03 K/UL
BASOPHILS NFR BLD AUTO: 0.5 %
BILIRUB SERPL-MCNC: 0.5 MG/DL
BUN SERPL-MCNC: 16 MG/DL
CALCIUM SERPL-MCNC: 10 MG/DL
CALPROTECTIN FECAL: 11 UG/G
CDIFF BY PCR: NOT DETECTED
CHLORIDE SERPL-SCNC: 103 MMOL/L
CO2 SERPL-SCNC: 28 MMOL/L
CREAT SERPL-MCNC: 1.04 MG/DL
EGFR: 75 ML/MIN/1.73M2
EOSINOPHIL # BLD AUTO: 0.31 K/UL
EOSINOPHIL NFR BLD AUTO: 5.3 %
GI PCR PANEL: NOT DETECTED
GLUCOSE SERPL-MCNC: 81 MG/DL
HCT VFR BLD CALC: 48.7 %
HGB BLD-MCNC: 15.3 G/DL
IGA SER QL IEP: 147 MG/DL
IMM GRANULOCYTES NFR BLD AUTO: 0.3 %
LYMPHOCYTES # BLD AUTO: 1.02 K/UL
LYMPHOCYTES NFR BLD AUTO: 17.5 %
MAN DIFF?: NORMAL
MCHC RBC-ENTMCNC: 31 PG
MCHC RBC-ENTMCNC: 31.4 GM/DL
MCV RBC AUTO: 98.6 FL
MONOCYTES # BLD AUTO: 0.66 K/UL
MONOCYTES NFR BLD AUTO: 11.3 %
NEUTROPHILS # BLD AUTO: 3.79 K/UL
NEUTROPHILS NFR BLD AUTO: 65.1 %
PLATELET # BLD AUTO: 251 K/UL
POTASSIUM SERPL-SCNC: 4.9 MMOL/L
PROT SERPL-MCNC: 7.3 G/DL
RBC # BLD: 4.94 M/UL
RBC # FLD: 12.3 %
SODIUM SERPL-SCNC: 141 MMOL/L
TTG IGA SER IA-ACNC: <0.5 U/ML
TTG IGA SER-ACNC: NEGATIVE
TTG IGG SER IA-ACNC: <0.8 U/ML
TTG IGG SER IA-ACNC: NEGATIVE
WBC # FLD AUTO: 5.83 K/UL

## 2024-12-31 ENCOUNTER — RX RENEWAL (OUTPATIENT)
Age: 74
End: 2024-12-31

## 2025-01-07 ENCOUNTER — APPOINTMENT (OUTPATIENT)
Age: 75
End: 2025-01-07

## 2025-01-07 VITALS
OXYGEN SATURATION: 97 % | DIASTOLIC BLOOD PRESSURE: 70 MMHG | BODY MASS INDEX: 25.31 KG/M2 | SYSTOLIC BLOOD PRESSURE: 120 MMHG | HEIGHT: 68 IN | HEART RATE: 78 BPM | WEIGHT: 167 LBS

## 2025-01-07 DIAGNOSIS — M79.641 PAIN IN RIGHT HAND: ICD-10-CM

## 2025-01-07 DIAGNOSIS — R06.02 SHORTNESS OF BREATH: ICD-10-CM

## 2025-01-07 DIAGNOSIS — R79.89 OTHER SPECIFIED ABNORMAL FINDINGS OF BLOOD CHEMISTRY: ICD-10-CM

## 2025-01-07 DIAGNOSIS — M79.642 PAIN IN RIGHT HAND: ICD-10-CM

## 2025-01-07 DIAGNOSIS — R05.1 ACUTE COUGH: ICD-10-CM

## 2025-01-07 DIAGNOSIS — R73.09 OTHER ABNORMAL GLUCOSE: ICD-10-CM

## 2025-01-07 DIAGNOSIS — R91.8 OTHER NONSPECIFIC ABNORMAL FINDING OF LUNG FIELD: ICD-10-CM

## 2025-01-07 DIAGNOSIS — M00.9 PYOGENIC ARTHRITIS, UNSPECIFIED: ICD-10-CM

## 2025-01-07 DIAGNOSIS — E78.5 HYPERLIPIDEMIA, UNSPECIFIED: ICD-10-CM

## 2025-01-07 DIAGNOSIS — Z87.01 PERSONAL HISTORY OF PNEUMONIA (RECURRENT): ICD-10-CM

## 2025-01-07 DIAGNOSIS — Z00.00 ENCOUNTER FOR GENERAL ADULT MEDICAL EXAMINATION W/OUT ABNORMAL FINDINGS: ICD-10-CM

## 2025-01-07 DIAGNOSIS — Z87.09 PERSONAL HISTORY OF OTHER DISEASES OF THE RESPIRATORY SYSTEM: ICD-10-CM

## 2025-01-07 PROCEDURE — 99213 OFFICE O/P EST LOW 20 MIN: CPT | Mod: 25

## 2025-01-07 PROCEDURE — G0439: CPT

## 2025-01-07 PROCEDURE — 36415 COLL VENOUS BLD VENIPUNCTURE: CPT

## 2025-01-08 ENCOUNTER — RESULT REVIEW (OUTPATIENT)
Age: 75
End: 2025-01-08

## 2025-01-09 LAB
ALBUMIN SERPL ELPH-MCNC: 4.7 G/DL
ALP BLD-CCNC: 68 U/L
ALT SERPL-CCNC: 18 U/L
ANION GAP SERPL CALC-SCNC: 13 MMOL/L
AST SERPL-CCNC: 17 U/L
BILIRUB SERPL-MCNC: 0.7 MG/DL
BUN SERPL-MCNC: 13 MG/DL
CALCIUM SERPL-MCNC: 10.3 MG/DL
CHLORIDE SERPL-SCNC: 98 MMOL/L
CHOLEST SERPL-MCNC: 163 MG/DL
CO2 SERPL-SCNC: 28 MMOL/L
CREAT SERPL-MCNC: 0.99 MG/DL
EGFR: 80 ML/MIN/1.73M2
ESTIMATED AVERAGE GLUCOSE: 120 MG/DL
GLUCOSE SERPL-MCNC: 100 MG/DL
HBA1C MFR BLD HPLC: 5.8 %
HDLC SERPL-MCNC: 55 MG/DL
LDLC SERPL CALC-MCNC: 94 MG/DL
NONHDLC SERPL-MCNC: 108 MG/DL
POTASSIUM SERPL-SCNC: 4.3 MMOL/L
PROT SERPL-MCNC: 7.5 G/DL
SODIUM SERPL-SCNC: 140 MMOL/L
TRIGL SERPL-MCNC: 75 MG/DL

## 2025-01-10 ENCOUNTER — APPOINTMENT (OUTPATIENT)
Dept: GASTROENTEROLOGY | Facility: CLINIC | Age: 75
End: 2025-01-10

## 2025-01-10 VITALS
SYSTOLIC BLOOD PRESSURE: 110 MMHG | BODY MASS INDEX: 26.84 KG/M2 | WEIGHT: 167 LBS | DIASTOLIC BLOOD PRESSURE: 80 MMHG | HEIGHT: 66 IN

## 2025-01-10 DIAGNOSIS — B19.20 UNSPECIFIED VIRAL HEPATITIS C W/OUT HEPATIC COMA: ICD-10-CM

## 2025-01-10 DIAGNOSIS — Z12.11 ENCOUNTER FOR SCREENING FOR MALIGNANT NEOPLASM OF COLON: ICD-10-CM

## 2025-01-10 DIAGNOSIS — R19.00 INTRA-ABDOMINAL AND PELVIC SWELLING, MASS AND LUMP, UNSPECIFIED SITE: ICD-10-CM

## 2025-01-10 DIAGNOSIS — R13.10 DYSPHAGIA, UNSPECIFIED: ICD-10-CM

## 2025-01-10 DIAGNOSIS — R19.4 CHANGE IN BOWEL HABIT: ICD-10-CM

## 2025-01-10 PROCEDURE — 99214 OFFICE O/P EST MOD 30 MIN: CPT

## 2025-01-10 PROCEDURE — G2211 COMPLEX E/M VISIT ADD ON: CPT

## 2025-01-12 LAB
TESTOST FREE SERPL-MCNC: 4.4 PG/ML
TESTOST SERPL-MCNC: 705 NG/DL

## 2025-01-23 ENCOUNTER — RESULT REVIEW (OUTPATIENT)
Age: 75
End: 2025-01-23

## 2025-01-29 ENCOUNTER — APPOINTMENT (OUTPATIENT)
Dept: PULMONOLOGY | Facility: CLINIC | Age: 75
End: 2025-01-29
Payer: MEDICARE

## 2025-01-29 VITALS
HEIGHT: 66 IN | DIASTOLIC BLOOD PRESSURE: 80 MMHG | HEART RATE: 75 BPM | OXYGEN SATURATION: 98 % | BODY MASS INDEX: 26.36 KG/M2 | SYSTOLIC BLOOD PRESSURE: 120 MMHG | WEIGHT: 164 LBS

## 2025-01-29 DIAGNOSIS — R06.00 DYSPNEA, UNSPECIFIED: ICD-10-CM

## 2025-01-29 PROCEDURE — 99214 OFFICE O/P EST MOD 30 MIN: CPT | Mod: 25

## 2025-02-14 ENCOUNTER — NON-APPOINTMENT (OUTPATIENT)
Age: 75
End: 2025-02-14

## 2025-02-14 ENCOUNTER — APPOINTMENT (OUTPATIENT)
Dept: CARDIOLOGY | Facility: CLINIC | Age: 75
End: 2025-02-14
Payer: MEDICARE

## 2025-02-14 VITALS
BODY MASS INDEX: 26.36 KG/M2 | SYSTOLIC BLOOD PRESSURE: 129 MMHG | DIASTOLIC BLOOD PRESSURE: 82 MMHG | HEIGHT: 66 IN | OXYGEN SATURATION: 97 % | WEIGHT: 164 LBS | HEART RATE: 77 BPM

## 2025-02-14 VITALS — DIASTOLIC BLOOD PRESSURE: 78 MMHG | SYSTOLIC BLOOD PRESSURE: 126 MMHG

## 2025-02-14 DIAGNOSIS — I25.10 ATHEROSCLEROTIC HEART DISEASE OF NATIVE CORONARY ARTERY W/OUT ANGINA PECTORIS: ICD-10-CM

## 2025-02-14 DIAGNOSIS — F32.A ANXIETY DISORDER, UNSPECIFIED: ICD-10-CM

## 2025-02-14 DIAGNOSIS — E78.5 HYPERLIPIDEMIA, UNSPECIFIED: ICD-10-CM

## 2025-02-14 DIAGNOSIS — R73.03 PREDIABETES.: ICD-10-CM

## 2025-02-14 DIAGNOSIS — F41.9 ANXIETY DISORDER, UNSPECIFIED: ICD-10-CM

## 2025-02-14 DIAGNOSIS — Z87.891 PERSONAL HISTORY OF NICOTINE DEPENDENCE: ICD-10-CM

## 2025-02-14 PROCEDURE — 99417 PROLNG OP E/M EACH 15 MIN: CPT

## 2025-02-14 PROCEDURE — 99205 OFFICE O/P NEW HI 60 MIN: CPT

## 2025-02-14 PROCEDURE — 93000 ELECTROCARDIOGRAM COMPLETE: CPT

## 2025-02-14 RX ORDER — PAROXETINE HYDROCHLORIDE 40 MG/1
40 TABLET, FILM COATED ORAL
Refills: 0 | Status: ACTIVE | COMMUNITY

## 2025-02-14 RX ORDER — TRAZODONE HYDROCHLORIDE 50 MG/1
50 TABLET ORAL
Refills: 0 | Status: ACTIVE | COMMUNITY

## 2025-02-15 PROBLEM — I25.10 CORONARY ARTERY DISEASE INVOLVING NATIVE CORONARY ARTERY OF NATIVE HEART, UNSPECIFIED WHETHER ANGINA PRESENT: Status: ACTIVE | Noted: 2025-02-15

## 2025-02-15 PROBLEM — R73.03 PRE-DIABETES: Status: ACTIVE | Noted: 2025-02-15

## 2025-02-15 RX ORDER — ALIROCUMAB 150 MG/ML
150 INJECTION, SOLUTION SUBCUTANEOUS
Qty: 3 | Refills: 3 | Status: ACTIVE | COMMUNITY
Start: 2025-02-15 | End: 1900-01-01

## 2025-03-03 ENCOUNTER — APPOINTMENT (OUTPATIENT)
Dept: FAMILY MEDICINE | Facility: CLINIC | Age: 75
End: 2025-03-03
Payer: MEDICARE

## 2025-03-03 VITALS
OXYGEN SATURATION: 98 % | HEART RATE: 73 BPM | HEIGHT: 66 IN | SYSTOLIC BLOOD PRESSURE: 150 MMHG | WEIGHT: 163 LBS | BODY MASS INDEX: 26.2 KG/M2 | DIASTOLIC BLOOD PRESSURE: 90 MMHG

## 2025-03-03 VITALS — DIASTOLIC BLOOD PRESSURE: 70 MMHG | SYSTOLIC BLOOD PRESSURE: 120 MMHG

## 2025-03-03 DIAGNOSIS — I25.10 ATHEROSCLEROTIC HEART DISEASE OF NATIVE CORONARY ARTERY W/OUT ANGINA PECTORIS: ICD-10-CM

## 2025-03-03 DIAGNOSIS — N40.0 BENIGN PROSTATIC HYPERPLASIA WITHOUT LOWER URINARY TRACT SYMPMS: ICD-10-CM

## 2025-03-03 DIAGNOSIS — Z91.89 OTHER SPECIFIED PERSONAL RISK FACTORS, NOT ELSEWHERE CLASSIFIED: ICD-10-CM

## 2025-03-03 DIAGNOSIS — E78.5 HYPERLIPIDEMIA, UNSPECIFIED: ICD-10-CM

## 2025-03-03 DIAGNOSIS — R73.03 PREDIABETES.: ICD-10-CM

## 2025-03-03 DIAGNOSIS — R79.89 OTHER SPECIFIED ABNORMAL FINDINGS OF BLOOD CHEMISTRY: ICD-10-CM

## 2025-03-03 DIAGNOSIS — M25.50 PAIN IN UNSPECIFIED JOINT: ICD-10-CM

## 2025-03-03 PROCEDURE — 99214 OFFICE O/P EST MOD 30 MIN: CPT

## 2025-03-03 PROCEDURE — 36415 COLL VENOUS BLD VENIPUNCTURE: CPT

## 2025-03-03 RX ORDER — BEMPEDOIC ACID 180 MG/1
180 TABLET, FILM COATED ORAL
Qty: 30 | Refills: 5 | Status: ACTIVE | COMMUNITY
Start: 2025-03-03 | End: 1900-01-01

## 2025-03-06 LAB
CHOLEST SERPL-MCNC: 148 MG/DL
HDLC SERPL-MCNC: 52 MG/DL
LDLC SERPL CALC-MCNC: 84 MG/DL
NONHDLC SERPL-MCNC: 96 MG/DL
TRIGL SERPL-MCNC: 58 MG/DL

## 2025-06-17 ENCOUNTER — APPOINTMENT (OUTPATIENT)
Dept: SURGERY | Facility: CLINIC | Age: 75
End: 2025-06-17
Payer: MEDICARE

## 2025-06-17 VITALS
TEMPERATURE: 98.3 F | DIASTOLIC BLOOD PRESSURE: 85 MMHG | WEIGHT: 160 LBS | HEART RATE: 67 BPM | SYSTOLIC BLOOD PRESSURE: 125 MMHG | HEIGHT: 67 IN | BODY MASS INDEX: 25.11 KG/M2 | OXYGEN SATURATION: 97 %

## 2025-06-17 PROCEDURE — 99213 OFFICE O/P EST LOW 20 MIN: CPT

## 2025-06-28 ENCOUNTER — RX RENEWAL (OUTPATIENT)
Age: 75
End: 2025-06-28

## 2025-07-11 ENCOUNTER — APPOINTMENT (OUTPATIENT)
Dept: GASTROENTEROLOGY | Facility: CLINIC | Age: 75
End: 2025-07-11
Payer: MEDICARE

## 2025-07-11 PROBLEM — K21.9 LARYNGOPHARYNGEAL REFLUX (LPR): Status: ACTIVE | Noted: 2025-07-11

## 2025-07-11 PROCEDURE — 99214 OFFICE O/P EST MOD 30 MIN: CPT

## 2025-07-11 PROCEDURE — G2211 COMPLEX E/M VISIT ADD ON: CPT

## 2025-07-15 ENCOUNTER — APPOINTMENT (OUTPATIENT)
Dept: GASTROENTEROLOGY | Facility: HOSPITAL | Age: 75
End: 2025-07-15

## 2025-07-15 ENCOUNTER — RESULT REVIEW (OUTPATIENT)
Age: 75
End: 2025-07-15

## 2025-07-31 ENCOUNTER — APPOINTMENT (OUTPATIENT)
Dept: FAMILY MEDICINE | Facility: CLINIC | Age: 75
End: 2025-07-31
Payer: MEDICARE

## 2025-07-31 VITALS
HEIGHT: 60 IN | WEIGHT: 162 LBS | HEART RATE: 68 BPM | SYSTOLIC BLOOD PRESSURE: 130 MMHG | BODY MASS INDEX: 31.8 KG/M2 | DIASTOLIC BLOOD PRESSURE: 80 MMHG

## 2025-07-31 DIAGNOSIS — F41.9 ANXIETY DISORDER, UNSPECIFIED: ICD-10-CM

## 2025-07-31 DIAGNOSIS — F32.A ANXIETY DISORDER, UNSPECIFIED: ICD-10-CM

## 2025-07-31 DIAGNOSIS — R73.03 PREDIABETES.: ICD-10-CM

## 2025-07-31 DIAGNOSIS — E78.5 HYPERLIPIDEMIA, UNSPECIFIED: ICD-10-CM

## 2025-07-31 DIAGNOSIS — R79.89 OTHER SPECIFIED ABNORMAL FINDINGS OF BLOOD CHEMISTRY: ICD-10-CM

## 2025-07-31 DIAGNOSIS — R73.09 OTHER ABNORMAL GLUCOSE: ICD-10-CM

## 2025-07-31 PROCEDURE — 36415 COLL VENOUS BLD VENIPUNCTURE: CPT

## 2025-07-31 PROCEDURE — 99214 OFFICE O/P EST MOD 30 MIN: CPT

## 2025-07-31 PROCEDURE — G2211 COMPLEX E/M VISIT ADD ON: CPT

## 2025-07-31 RX ORDER — ICOSAPENT ETHYL 1 G/1
1 CAPSULE ORAL TWICE DAILY
Qty: 180 | Refills: 0 | Status: ACTIVE | COMMUNITY
Start: 2025-07-31 | End: 1900-01-01

## 2025-08-04 LAB
ALBUMIN SERPL ELPH-MCNC: 4.5 G/DL
ALP BLD-CCNC: 62 U/L
ALT SERPL-CCNC: 19 U/L
ANION GAP SERPL CALC-SCNC: 11 MMOL/L
AST SERPL-CCNC: 21 U/L
BASOPHILS # BLD AUTO: 0.04 K/UL
BASOPHILS NFR BLD AUTO: 1 %
BILIRUB SERPL-MCNC: 0.6 MG/DL
BUN SERPL-MCNC: 19 MG/DL
CALCIUM SERPL-MCNC: 10 MG/DL
CHLORIDE SERPL-SCNC: 105 MMOL/L
CHOLEST SERPL-MCNC: 168 MG/DL
CO2 SERPL-SCNC: 26 MMOL/L
CREAT SERPL-MCNC: 1.2 MG/DL
EGFRCR SERPLBLD CKD-EPI 2021: 63 ML/MIN/1.73M2
EOSINOPHIL # BLD AUTO: 0.26 K/UL
EOSINOPHIL NFR BLD AUTO: 6.4 %
ESTIMATED AVERAGE GLUCOSE: 114 MG/DL
GLUCOSE SERPL-MCNC: 113 MG/DL
HBA1C MFR BLD HPLC: 5.6 %
HCT VFR BLD CALC: 46.3 %
HDLC SERPL-MCNC: 56 MG/DL
HGB BLD-MCNC: 14.7 G/DL
IMM GRANULOCYTES NFR BLD AUTO: 0.2 %
LDLC SERPL-MCNC: 99 MG/DL
LYMPHOCYTES # BLD AUTO: 1.07 K/UL
LYMPHOCYTES NFR BLD AUTO: 26.2 %
MAN DIFF?: NORMAL
MCHC RBC-ENTMCNC: 31.5 PG
MCHC RBC-ENTMCNC: 31.7 G/DL
MCV RBC AUTO: 99.4 FL
MONOCYTES # BLD AUTO: 0.45 K/UL
MONOCYTES NFR BLD AUTO: 11 %
NEUTROPHILS # BLD AUTO: 2.25 K/UL
NEUTROPHILS NFR BLD AUTO: 55.2 %
NONHDLC SERPL-MCNC: 112 MG/DL
PLATELET # BLD AUTO: 253 K/UL
POTASSIUM SERPL-SCNC: 4.8 MMOL/L
PROT SERPL-MCNC: 7.1 G/DL
RBC # BLD: 4.66 M/UL
RBC # FLD: 13 %
SODIUM SERPL-SCNC: 142 MMOL/L
TESTOST FREE SERPL-MCNC: 9.6 PG/ML
TESTOST SERPL-MCNC: 536 NG/DL
TRIGL SERPL-MCNC: 64 MG/DL
WBC # FLD AUTO: 4.08 K/UL

## 2025-08-26 ENCOUNTER — APPOINTMENT (OUTPATIENT)
Dept: CARDIOLOGY | Facility: CLINIC | Age: 75
End: 2025-08-26
Payer: MEDICARE

## 2025-08-26 VITALS
BODY MASS INDEX: 28.88 KG/M2 | HEIGHT: 63 IN | HEART RATE: 63 BPM | OXYGEN SATURATION: 98 % | SYSTOLIC BLOOD PRESSURE: 134 MMHG | DIASTOLIC BLOOD PRESSURE: 77 MMHG | WEIGHT: 163 LBS

## 2025-08-26 DIAGNOSIS — E78.5 HYPERLIPIDEMIA, UNSPECIFIED: ICD-10-CM

## 2025-08-26 DIAGNOSIS — R73.03 PREDIABETES.: ICD-10-CM

## 2025-08-26 DIAGNOSIS — I25.10 ATHEROSCLEROTIC HEART DISEASE OF NATIVE CORONARY ARTERY W/OUT ANGINA PECTORIS: ICD-10-CM

## 2025-08-26 PROCEDURE — 93000 ELECTROCARDIOGRAM COMPLETE: CPT

## 2025-08-26 PROCEDURE — 99214 OFFICE O/P EST MOD 30 MIN: CPT | Mod: 25

## 2025-08-26 RX ORDER — EZETIMIBE 10 MG/1
10 TABLET ORAL
Qty: 90 | Refills: 3 | Status: ACTIVE | COMMUNITY
Start: 2025-08-26 | End: 1900-01-01

## 2025-08-29 RX ORDER — INCLISIRAN 284 MG/1.5ML
284 INJECTION, SOLUTION SUBCUTANEOUS
Qty: 3 | Refills: 0 | Status: COMPLETED | OUTPATIENT
Start: 2025-08-28 | End: 2025-08-29

## 2025-08-29 RX ORDER — INCLISIRAN 284 MG/1.5ML
284 INJECTION, SOLUTION SUBCUTANEOUS
Qty: 1 | Refills: 4 | Status: ACTIVE | OUTPATIENT
Start: 2025-08-29